# Patient Record
Sex: MALE | Race: WHITE | NOT HISPANIC OR LATINO | Employment: FULL TIME | URBAN - METROPOLITAN AREA
[De-identification: names, ages, dates, MRNs, and addresses within clinical notes are randomized per-mention and may not be internally consistent; named-entity substitution may affect disease eponyms.]

---

## 2017-12-22 ENCOUNTER — MYC REFILL (OUTPATIENT)
Dept: FAMILY MEDICINE | Facility: CLINIC | Age: 47
End: 2017-12-22

## 2017-12-22 DIAGNOSIS — N52.9 ERECTILE DYSFUNCTION, UNSPECIFIED ERECTILE DYSFUNCTION TYPE: ICD-10-CM

## 2017-12-22 RX ORDER — SILDENAFIL CITRATE 20 MG/1
20-60 TABLET ORAL DAILY PRN
Qty: 30 TABLET | Refills: 0 | Status: SHIPPED | OUTPATIENT
Start: 2017-12-22 | End: 2018-01-05

## 2017-12-22 NOTE — TELEPHONE ENCOUNTER
Message from Carbon Blackhart:  Original authorizing provider: Pro Velez MD    Kalyan Hubbard would like a refill of the following medications:  sildenafil (REVATIO/VIAGRA) 20 MG tablet [Pro Velez MD]    Preferred pharmacy: [facility], [facility] [phone]    Comment:

## 2017-12-22 NOTE — TELEPHONE ENCOUNTER
Medication is being filled for 1 time refill only due to:  Patient needs to be seen because has been a year.  does have appt..  Marcie Harkins RN

## 2018-01-05 ENCOUNTER — OFFICE VISIT (OUTPATIENT)
Dept: FAMILY MEDICINE | Facility: CLINIC | Age: 48
End: 2018-01-05
Payer: COMMERCIAL

## 2018-01-05 VITALS
RESPIRATION RATE: 14 BRPM | TEMPERATURE: 98 F | OXYGEN SATURATION: 100 % | HEIGHT: 71 IN | BODY MASS INDEX: 24.92 KG/M2 | HEART RATE: 63 BPM | WEIGHT: 178 LBS | SYSTOLIC BLOOD PRESSURE: 116 MMHG | DIASTOLIC BLOOD PRESSURE: 79 MMHG

## 2018-01-05 DIAGNOSIS — Z00.00 ROUTINE GENERAL MEDICAL EXAMINATION AT A HEALTH CARE FACILITY: Primary | ICD-10-CM

## 2018-01-05 DIAGNOSIS — N52.9 ERECTILE DYSFUNCTION, UNSPECIFIED ERECTILE DYSFUNCTION TYPE: ICD-10-CM

## 2018-01-05 DIAGNOSIS — F32.5 MAJOR DEPRESSION IN COMPLETE REMISSION (H): ICD-10-CM

## 2018-01-05 DIAGNOSIS — M76.62 ACHILLES TENDONITIS, BILATERAL: ICD-10-CM

## 2018-01-05 DIAGNOSIS — Z23 NEED FOR PROPHYLACTIC VACCINATION AND INOCULATION AGAINST INFLUENZA: ICD-10-CM

## 2018-01-05 DIAGNOSIS — M76.61 ACHILLES TENDONITIS, BILATERAL: ICD-10-CM

## 2018-01-05 DIAGNOSIS — B35.1 ONYCHOMYCOSIS: ICD-10-CM

## 2018-01-05 DIAGNOSIS — M25.511 RIGHT SHOULDER PAIN, UNSPECIFIED CHRONICITY: ICD-10-CM

## 2018-01-05 DIAGNOSIS — B00.9 HERPES SIMPLEX VIRUS INFECTION: ICD-10-CM

## 2018-01-05 LAB
ALBUMIN SERPL-MCNC: 4.3 G/DL (ref 3.4–5)
ALP SERPL-CCNC: 65 U/L (ref 40–150)
ALT SERPL W P-5'-P-CCNC: 25 U/L (ref 0–70)
ANION GAP SERPL CALCULATED.3IONS-SCNC: 3 MMOL/L (ref 3–14)
AST SERPL W P-5'-P-CCNC: 15 U/L (ref 0–45)
BILIRUB SERPL-MCNC: 0.6 MG/DL (ref 0.2–1.3)
BUN SERPL-MCNC: 18 MG/DL (ref 7–30)
CALCIUM SERPL-MCNC: 9.5 MG/DL (ref 8.5–10.1)
CHLORIDE SERPL-SCNC: 104 MMOL/L (ref 94–109)
CHOLEST SERPL-MCNC: 222 MG/DL
CO2 SERPL-SCNC: 34 MMOL/L (ref 20–32)
CREAT SERPL-MCNC: 0.9 MG/DL (ref 0.66–1.25)
GFR SERPL CREATININE-BSD FRML MDRD: >90 ML/MIN/1.7M2
GLUCOSE SERPL-MCNC: 54 MG/DL (ref 70–99)
HDLC SERPL-MCNC: 47 MG/DL
LDLC SERPL CALC-MCNC: 140 MG/DL
NONHDLC SERPL-MCNC: 175 MG/DL
POTASSIUM SERPL-SCNC: 5.5 MMOL/L (ref 3.4–5.3)
PROT SERPL-MCNC: 7.4 G/DL (ref 6.8–8.8)
SODIUM SERPL-SCNC: 141 MMOL/L (ref 133–144)
TRIGL SERPL-MCNC: 174 MG/DL

## 2018-01-05 PROCEDURE — 90472 IMMUNIZATION ADMIN EACH ADD: CPT | Performed by: FAMILY MEDICINE

## 2018-01-05 PROCEDURE — 99214 OFFICE O/P EST MOD 30 MIN: CPT | Mod: 25 | Performed by: FAMILY MEDICINE

## 2018-01-05 PROCEDURE — 80053 COMPREHEN METABOLIC PANEL: CPT | Performed by: FAMILY MEDICINE

## 2018-01-05 PROCEDURE — 90471 IMMUNIZATION ADMIN: CPT | Performed by: FAMILY MEDICINE

## 2018-01-05 PROCEDURE — 99396 PREV VISIT EST AGE 40-64: CPT | Mod: 25 | Performed by: FAMILY MEDICINE

## 2018-01-05 PROCEDURE — 80061 LIPID PANEL: CPT | Performed by: FAMILY MEDICINE

## 2018-01-05 PROCEDURE — 36415 COLL VENOUS BLD VENIPUNCTURE: CPT | Performed by: FAMILY MEDICINE

## 2018-01-05 PROCEDURE — 90746 HEPB VACCINE 3 DOSE ADULT IM: CPT | Performed by: FAMILY MEDICINE

## 2018-01-05 PROCEDURE — 90686 IIV4 VACC NO PRSV 0.5 ML IM: CPT | Performed by: FAMILY MEDICINE

## 2018-01-05 RX ORDER — SILDENAFIL CITRATE 20 MG/1
20-60 TABLET ORAL DAILY PRN
Qty: 30 TABLET | Refills: 11 | Status: SHIPPED | OUTPATIENT
Start: 2018-01-05 | End: 2019-11-26

## 2018-01-05 RX ORDER — TERBINAFINE HYDROCHLORIDE 250 MG/1
250 TABLET ORAL DAILY
Qty: 90 TABLET | Refills: 0 | Status: SHIPPED | OUTPATIENT
Start: 2018-01-05 | End: 2019-11-22

## 2018-01-05 RX ORDER — VALACYCLOVIR HYDROCHLORIDE 500 MG/1
500 TABLET, FILM COATED ORAL DAILY
Qty: 90 TABLET | Refills: 3 | Status: SHIPPED | OUTPATIENT
Start: 2018-01-05 | End: 2021-11-24

## 2018-01-05 NOTE — PROGRESS NOTES
SUBJECTIVE:   CC: Kalyan Hubbard is an 47 year old male who presents for preventative health visit.     Healthy Habits:    Do you get at least three servings of calcium containing foods daily (dairy, green leafy vegetables, etc.)? Yes-almost everyday    Amount of exercise or daily activities, outside of work: 5-7 day(s) per week    Problems taking medications regularly No    Medication side effects: No    Have you had an eye exam in the past two years? yes    Do you see a dentist twice per year? yes    Do you have sleep apnea, excessive snoring or daytime drowsiness?no     Other Concern:    sports injury-shoulder pain when swimming. Not noted much at other times. Usually with over the head activities. No weakness   achilles pain bilateral. Worse when runs especially longer distances. stiffens when sitting.  Refills for ed meds and depression med.s both of these are working well.   oncyhomycosis-interested in meds. Worsening slowly.    Today's PHQ-2 Score:   PHQ-2 ( 1999 Pfizer) 1/5/2018 10/25/2016   Q1: Little interest or pleasure in doing things 0 0   Q2: Feeling down, depressed or hopeless 0 0   PHQ-2 Score 0 0   Q1: Little interest or pleasure in doing things - -   Q2: Feeling down, depressed or hopeless - -   PHQ-2 Score - -         Abuse: Current or Past(Physical, Sexual or Emotional)- No  Do you feel safe in your environment - Yes  Social History   Substance Use Topics     Smoking status: Never Smoker     Smokeless tobacco: Never Used     Alcohol use No      Comment: very rare      If you drink alcohol do you typically have >3 drinks per day or >7 drinks per week? No                      Last PSA: No results found for: PSA    Reviewed orders with patient. Reviewed health maintenance and updated orders accordingly - Yes  Labs reviewed in EPIC    Reviewed and updated as needed this visit by clinical staff  Tobacco  Meds  Med Hx  Surg Hx  Fam Hx  Soc Hx        Reviewed and updated as needed this visit by  "Provider  Meds            ROS:  C: NEGATIVE for fever, chills, change in weight  I: NEGATIVE for worrisome rashes, moles or lesions  E: NEGATIVE for vision changes or irritation  ENT: NEGATIVE for ear, mouth and throat problems  R: NEGATIVE for significant cough or SOB  CV: NEGATIVE for chest pain, palpitations or peripheral edema  GI: NEGATIVE for nausea, abdominal pain, heartburn, or change in bowel habits   male: negative for dysuria, hematuria, decreased urinary stream, erectile dysfunction, urethral discharge  MUSCULOSKELETAL: see above.   N: NEGATIVE for weakness, dizziness or paresthesias  P: NEGATIVE for changes in mood or affect    OBJECTIVE:   /79 (BP Location: Right arm, Patient Position: Sitting, Cuff Size: Adult Regular)  Pulse 63  Temp 98  F (36.7  C) (Oral)  Resp 14  Ht 5' 11\" (1.803 m)  Wt 178 lb (80.7 kg)  SpO2 100%  BMI 24.83 kg/m2  EXAM:  GENERAL: healthy, alert and no distress  EYES: Eyes grossly normal to inspection, PERRL and conjunctivae and sclerae normal  HENT: ear canals and TM's normal, nose and mouth without ulcers or lesions  NECK: no adenopathy, no asymmetry, masses, or scars and thyroid normal to palpation  RESP: lungs clear to auscultation - no rales, rhonchi or wheezes  CV: regular rate and rhythm, normal S1 S2, no S3 or S4, no murmur, click or rub, no peripheral edema and peripheral pulses strong  ABDOMEN: soft, nontender, no hepatosplenomegaly, no masses and bowel sounds normal  MS: Shoulder exam: normal exam, no swelling, tenderness, instability; ligaments intact, FROM shoulder joint. except painful arc and positive impingement signs  Foot/ankle exam: soft tissue tenderness at the mid achilles. No ankle abnormalities.   SKIN: no suspicious lesions or rashes and onychomycosis  NEURO: Normal strength and tone, mentation intact and speech normal  PSYCH: mentation appears normal, affect normal/bright    ASSESSMENT/PLAN:       ICD-10-CM    1. Routine general medical " "examination at a health care facility Z00.00 Lipid panel reflex to direct LDL Fasting     Comprehensive metabolic panel     HEPATITIS B VACCINE,ADULT,IM     CANCELED: HEPATITIS B VACCINE, ADULT, IM   2. Need for prophylactic vaccination and inoculation against influenza Z23 HC FLU VAC PRESRV FREE QUAD SPLIT VIR 3+YRS IM  [59001]          ADMIN VACCINE, FIRST [88293]     CANCELED: HEPATITIS B VACCINE, ADULT, IM   3. Erectile dysfunction, unspecified erectile dysfunction type N52.9 sildenafil (REVATIO) 20 MG tablet   4. Herpes simplex virus infection B00.9 valACYclovir (VALTREX) 500 MG tablet     Comprehensive metabolic panel   5. Major depression in complete remission (H) F32.5    6. Achilles tendonitis, bilateral M76.61 [facility] PT, HAND, AND CHIROPRACTIC REFERRAL    M76.62    7. Right shoulder pain, unspecified chronicity M25.511 [facility], HAND, AND CHIR[facility]RACTIC REFERRAL   8. Onychomycosis B35.1 terbinafine (LAMISIL) 250 MG tablet     **ALT FUTURE anytime   discussed optoins for onychomycosis. Lamisil. recheck alt in 4 weeks. refilled depression meds and ed meds. Physical therapy for msk complaints. If not getting better then to sports med.     COUNSELING:  Reviewed preventive health counseling, as reflected in patient instructions       Regular exercise       Healthy diet/nutrition       Immunizations    Vaccinated for: Hepatitis B and Influenza           Colon cancer screening         reports that he has never smoked. He has never used smokeless tobacco.      Estimated body mass index is 24.83 kg/(m^2) as calculated from the following:    Height as of this encounter: 5' 11\" (1.803 m).    Weight as of this encounter: 178 lb (80.7 kg).         Counseling Resources:  ATP IV Guidelines  Pooled Cohorts Equation Calculator  FRAX Risk Assessment  ICSI Preventive Guidelines  Dietary Guidelines for Americans, 2010  USDA's MyPlate  ASA Prophylaxis  Lung CA Screening    Pro Velez MD  [facility] " Influenza Immunization Documentation    1.  Is the person to be vaccinated sick today?   No    2. Does the person to be vaccinated have an allergy to a component   of the vaccine?   No  Egg Allergy Algorithm Link    3. Has the person to be vaccinated ever had a serious reaction   to influenza vaccine in the past?   No    4. Has the person to be vaccinated ever had Guillain-Barré syndrome?   No    Form completed by Donald Shaw MA

## 2018-01-05 NOTE — NURSING NOTE
"Chief Complaint   Patient presents with     Physical       Initial /79 (BP Location: Right arm, Patient Position: Sitting, Cuff Size: Adult Regular)  Pulse 63  Temp 98  F (36.7  C) (Oral)  Resp 14  Ht 5' 11\" (1.803 m)  Wt 178 lb (80.7 kg)  SpO2 100%  BMI 24.83 kg/m2 Estimated body mass index is 24.83 kg/(m^2) as calculated from the following:    Height as of this encounter: 5' 11\" (1.803 m).    Weight as of this encounter: 178 lb (80.7 kg).  Medication Reconciliation: complete       Donald Shaw MA      "

## 2018-01-05 NOTE — NURSING NOTE
Screening Questionnaire for Adult Immunization    Are you sick today?   No   Do you have allergies to medications, food, a vaccine component or latex?   No   Have you ever had a serious reaction after receiving a vaccination?   No   Do you have a long-term health problem with heart disease, lung disease, asthma, kidney disease, metabolic disease (e.g. diabetes), anemia, or other blood disorder?   No   Do you have cancer, leukemia, HIV/AIDS, or any other immune system problem?   No   In the past 3 months, have you taken medications that affect  your immune system, such as prednisone, other steroids, or anticancer drugs; drugs for the treatment of rheumatoid arthritis, Crohn s disease, or psoriasis; or have you had radiation treatments?   No   Have you had a seizure, or a brain or other nervous system problem?   No   During the past year, have you received a transfusion of blood or blood     products, or been given immune (gamma) globulin or antiviral drug?   No   For women: Are you pregnant or is there a chance you could become        pregnant during the next month?   No   Have you received any vaccinations in the past 4 weeks?   No     Immunization questionnaire answers were all negative.        Per orders of Dr. Velez, injection of HEP B and Flu given by Donald Shaw. Patient instructed to remain in clinic for 15 minutes afterwards, and to report any adverse reaction to me immediately.       Screening performed by Donald Shaw on 1/5/2018 at 9:01 AM.

## 2018-01-05 NOTE — MR AVS SNAPSHOT
After Visit Summary   1/5/2018    Kalyan Hubbard    MRN: [id number]           Patient Information     Date Of Birth          1970        Visit Information        Provider Department      1/5/2018 8:00 AM Pro Velez MD [facility]        Today's Diagnoses     Routine general medical examination at a health care facility    -  1    Need for prophylactic vaccination and inoculation against influenza        Erectile dysfunction, unspecified erectile dysfunction type        Herpes simplex virus infection        Major depression in complete remission (H)        Achilles tendonitis, bilateral        Right shoulder pain, unspecified chronicity        Onychomycosis          Care Instructions      Preventive Health Recommendations  Male Ages 40 to 49    Yearly exam:             See your health care provider every year in order to  o   Review health changes.   o   Discuss preventive care.    o   Review your medicines if your doctor has prescribed any.    You should be tested each year for STDs (sexually transmitted diseases) if you re at risk.     Have a cholesterol test every 5 years.     Have a colonoscopy (test for colon cancer) if someone in your family has had colon cancer or polyps before age 50.     After age 45, have a diabetes test (fasting glucose). If you are at risk for diabetes, you should have this test every 3 years.      Talk with your health care provider about whether or not a prostate cancer screening test (PSA) is right for you.    Shots: Get a flu shot each year. Get a tetanus shot every 10 years.     Nutrition:    Eat at least 5 servings of fruits and vegetables daily.     Eat whole-grain bread, whole-wheat pasta and brown rice instead of white grains and rice.     Talk to your provider about Calcium and Vitamin D.     Lifestyle    Exercise for at least 150 minutes a week (30 minutes a day, 5 days a week). This will help you control your weight and prevent  disease.     Limit alcohol to one drink per day.     No smoking.     Wear sunscreen to prevent skin cancer.     See your dentist every six months for an exam and cleaning.              Follow-ups after your visit        Additional Services     [facility] PT, HAND, AND CHIROPRACTIC REFERRAL       **This order will print in the [facility] Scheduling Office**    Physical Therapy, Hand Therapy and Chiropractic Care are available through:    *[facility]  *[facility]  *[facility]    Call one number to schedule at any of the above locations: [phone].    Your provider has referred you to: Physical Therapy at [facility] or [facility]    Indication/Reason for Referral: Shoulder Pain and achilles pain  Onset of Illness: weeks to months  Therapy Orders: Evaluate and Treat  Special Programs: None  Special Request: None    Ramsey Mustafa      Additional Comments for the Therapist or Chiropractor:      Please be aware that coverage of these services is subject to the terms and limitations of your health insurance plan.  Call member services at your health plan with any benefit or coverage questions.      Please bring the following to your appointment:    *Your personal calendar for scheduling future appointments  *Comfortable clothing                  Follow-up notes from your care team     Return in about 1 year (around 1/5/2019) for Physical Exam.      Future tests that were ordered for you today     Open Standing Orders        Priority Remaining Interval Expires Ordered    HEPATITIS B VACCINE, ADULT, IM Routine 2/2  1/5/2019 1/5/2018          Open Future Orders        Priority Expected Expires Ordered    **ALT FUTURE anytime Routine 1/5/2018 1/5/2019 1/5/2018            Who to contact     If you have questions or need follow up information about today's clinic visit or your schedule please contact [facility] directly at [phone].  Normal or non-critical lab and imaging  "results will be communicated to you by MyChart, letter or phone within 4 business days after the clinic has received the results. If you do not hear from us within 7 days, please contact the clinic through [employer] or phone. If you have a critical or abnormal lab result, we will notify you by phone as soon as possible.  Submit refill requests through [employer] or call your pharmacy and they will forward the refill request to us. Please allow 3 business days for your refill to be completed.          Additional Information About Your Visit        [employer] Information     [employer] gives you secure access to your electronic health record. If you see a primary care provider, you can also send messages to your care team and make appointments. If you have questions, please call your primary care clinic.  If you do not have a primary care provider, please call [phone] and they will assist you.        Care EveryWhere ID     This is your Care EveryWhere ID. This could be used by other organizations to access your [facility] medical records  [id number]        Your Vitals Were     Pulse Temperature Respirations Height Pulse Oximetry BMI (Body Mass Index)    63 98  F (36.7  C) (Oral) 14 5' 11\" (1.803 m) 100% 24.83 kg/m2       Blood Pressure from Last 3 Encounters:   01/05/18 116/79   10/25/16 100/64   09/30/16 110/80    Weight from Last 3 Encounters:   01/05/18 178 lb (80.7 kg)   10/25/16 190 lb (86.2 kg)   09/30/16 196 lb (88.9 kg)              We Performed the Following          ADMIN VACCINE, FIRST [19624]     Comprehensive metabolic panel     DEPRESSION ACTION PLAN (DAP)     HC FLU VAC PRESRV FREE QUAD SPLIT VIR 3+YRS IM  [[id number]]     [facility] PT, HAND, AND CHIROPRACTIC REFERRAL     Lipid panel reflex to direct LDL Fasting          Today's Medication Changes          These changes are accurate as of: 1/5/18  8:35 AM.  If you have any questions, ask your nurse or doctor.               Start taking these medicines.        " Dose/Directions    terbinafine 250 MG tablet   Commonly known as:  lamISIL   Used for:  Onychomycosis   Started by:  Pro Velez MD        Dose:  250 mg   Take 1 tablet (250 mg) by mouth daily   Quantity:  90 tablet   Refills:  0            Where to get your medicines      These medications were sent to [facility] [address] [place] [address] [address] [facility]  [address] [facility] [address]     Phone:  [phone]     sildenafil 20 MG tablet    terbinafine 250 MG tablet    valACYclovir 500 MG tablet                Primary Care Provider Office Phone # Fax #    Pro Velez [phone]       [address]  [facility] [address]        Equal Access to Services     MATA ARRIETA : Hadii chen cody hadasho Sociro, waaxda luqadaha, qaybta kaalmada adeegyada, pari ham . So [facility] [phone].    ATENCIÓN: Si habla español, tiene a toussaint disposición servicios gratuitos de asistencia lingüística. [facility] [phone].    We comply with applicable federal civil rights laws and [place] laws. We do not discriminate on the basis of race, color, national origin, age, disability, sex, sexual orientation, or gender identity.            Thank you!     Thank you for choosing [facility]  for your care. Our goal is always to provide you with excellent care. Hearing back from our patients is one way we can continue to improve our services. Please take a few minutes to complete the written survey that you may receive in the mail after your visit with us. Thank you!             Your Updated Medication List - Protect others around you: Learn how to safely use, store and throw away your medicines at www.disposemymeds.org.          This list is accurate as of: 1/5/18  8:35 AM.  Always use your most recent med list.                   Brand Name Dispense Instructions for use Diagnosis    sildenafil 20 MG tablet    REVATIO    30 tablet    Take 1-3  tablets (20-60 mg) by mouth daily as needed For pulmonary hypertension.  Never use with nitroglycerin, terazosin or doxazosin.    Erectile dysfunction, unspecified erectile dysfunction type       terbinafine 250 MG tablet    lamISIL    90 tablet    Take 1 tablet (250 mg) by mouth daily    Onychomycosis       valACYclovir 500 MG tablet    VALTREX    90 tablet    Take 1 tablet (500 mg) by mouth daily    Herpes simplex virus infection

## 2018-01-05 NOTE — LETTER
My Depression Action Plan  Name: Kalyan Hubbard   Date of Birth 1970  Date: 1/5/2018    My doctor: Pro Velez   My clinic: [address] [id number] [phone]          GREEN    ZONE   Good Control    What it looks like:     Things are going generally well. You have normal up s and down s. You may even feel depressed from time to time, but bad moods usually last less than a day.   What you need to do:  1. Continue to care for yourself (see self care plan)  2. Check your depression survival kit and update it as needed  3. Follow your physician s recommendations including any medication.  4. Do not stop taking medication unless you consult with your physician first.           YELLOW         ZONE Getting Worse    What it looks like:     Depression is starting to interfere with your life.     It may be hard to get out of bed; you may be starting to isolate yourself from others.    Symptoms of depression are starting to last most all day and this has happened for several days.     You may have suicidal thoughts but they are not constant.   What you need to do:     1. Call your care team, your response to treatment will improve if you keep your care team informed of your progress. Yellow periods are signs an adjustment may need to be made.     2. Continue your self-care, even if you have to fake it!    3. Talk to someone in your support network    4. Open up your depression survival kit           RED    ZONE Medical Alert - Get Help    What it looks like:     Depression is seriously interfering with your life.     You may experience these or other symptoms: You can t get out of bed most days, can t work or engage in other necessary activities, you have trouble taking care of basic hygiene, or basic responsibilities, thoughts of suicide or death that will not go away, self-injurious behavior.     What you need to do:  1. Call your care team and  request a same-day appointment. If they are not available (weekends or after hours) call your local crisis line, emergency room or 911.      Electronically signed by: Donald Shaw, January 5, 2018    Depression Self Care Plan / Survival Kit    Self-Care for Depression  Here s the deal. Your body and mind are really not as separate as most people think.  What you do and think affects how you feel and how you feel influences what you do and think. This means if you do things that people who feel good do, it will help you feel better.  Sometimes this is all it takes.  There is also a place for medication and therapy depending on how severe your depression is, so be sure to consult with your medical provider and/ or Behavioral Health Consultant if your symptoms are worsening or not improving.     In order to better manage my stress, I will:    Exercise  Get some form of exercise, every day. This will help reduce pain and release endorphins, the  feel good  chemicals in your brain. This is almost as good as taking antidepressants!  This is not the same as joining a gym and then never going! (they count on that by the way ) It can be as simple as just going for a walk or doing some gardening, anything that will get you moving.      Hygiene   Maintain good hygiene (Get out of bed in the morning, Make your bed, Brush your teeth, Take a shower, and Get dressed like you were going to work, even if you are unemployed).  If your clothes don't fit try to get ones that do.    Diet  I will strive to eat foods that are good for me, drink plenty of water, and avoid excessive sugar, caffeine, alcohol, and other mood-altering substances.  Some foods that are helpful in depression are: complex carbohydrates, B vitamins, flaxseed, fish or fish oil, fresh fruits and vegetables.    Psychotherapy  I agree to participate in Individual Therapy (if recommended).    Medication  If prescribed medications, I agree to take them.  Missing doses can  result in serious side effects.  I understand that drinking alcohol, or other illicit drug use, may cause potential side effects.  I will not stop my medication abruptly without first discussing it with my provider.    Staying Connected With Others  I will stay in touch with my friends, family members, and my primary care provider/team.    Use your imagination  Be creative.  We all have a creative side; it doesn t matter if it s oil painting, sand castles, or mud pies! This will also kick up the endorphins.    Witness Beauty  (AKA stop and smell the roses) Take a look outside, even in mid-winter. Notice colors, textures. Watch the squirrels and birds.     Service to others  Be of service to others.  There is always someone else in need.  By helping others we can  get out of ourselves  and remember the really important things.  This also provides opportunities for practicing all the other parts of the program.    Humor  Laugh and be silly!  Adjust your TV habits for less news and crime-drama and more comedy.    Control your stress  Try breathing deep, massage therapy, biofeedback, and meditation. Find time to relax each day.     My support system    Clinic Contact:  Phone number:    Contact 1:  Phone number:    Contact 2:  Phone number:    Orthodoxy/:  Phone number:    Therapist:  Phone number:    Local crisis center:    Phone number:    Other community support:  Phone number:

## 2018-01-08 ENCOUNTER — TELEPHONE (OUTPATIENT)
Dept: FAMILY MEDICINE | Facility: CLINIC | Age: 48
End: 2018-01-08

## 2018-01-08 DIAGNOSIS — E87.5 SERUM POTASSIUM ELEVATED: Primary | ICD-10-CM

## 2018-01-08 NOTE — TELEPHONE ENCOUNTER
Your liver tests, kidney tests, and electrolytes are normal except the potassium was high. Let's just recheck this within the next month.     Your Total and LDL (bad) cholesterol levels are mildly elevated.   Diet and exercise changes are recommended to help lower these. I would not use medication at this level. Dietary changes to avoid saturated fats, and include more unsaturated fats (such as olive oil), soy protein, omega three fatty acids (as found in supplements or fish), and more nuts as protein may help bring down cholesterol. If you would like to meet with a dietician, let us know.     I will have my nurse try to call you with the results to make sure you get the message.    Pro Velez

## 2018-01-08 NOTE — LETTER
"January 9, 2018      Kalyan Hubbard  [address] [facility] [address]        Dear Kalyan,       I was unable to reach you by phone but here are the comments from Dr. Velez regarding your test results.  There are lab orders in your chart so you can make a \"LAB ONLY\" visit in about a month to recheck your potassium level.  Marcie BRAN RN    Notes from Dr. Velez:    Your liver tests, kidney tests, and electrolytes are normal except the potassium was high. Let's just recheck this within the next month.      Your Total and LDL (bad) cholesterol levels are mildly elevated.   Diet and exercise changes are recommended to help lower these. I would not use medication at this level. Dietary changes to avoid saturated fats, and include more unsaturated fats (such as olive oil), soy protein, omega three fatty acids (as found in supplements or fish), and more nuts as protein may help bring down cholesterol. If you would like to meet with a dietician, let us know.      I will have my nurse try to call you with the results to make sure you get the message.            Sincerely,        Pro Velez MD/TE          "

## 2018-01-08 NOTE — TELEPHONE ENCOUNTER
KATHERINE and asked him to call back to discuss lab results and recommendations.  Marcie Harkins RN

## 2018-01-09 NOTE — TELEPHONE ENCOUNTER
[facility]  Now mailing letter and sending low cholesterol diet info.  Marcie Harkins RN

## 2018-01-16 ENCOUNTER — THERAPY VISIT (OUTPATIENT)
Dept: PHYSICAL THERAPY | Facility: CLINIC | Age: 48
End: 2018-01-16
Payer: COMMERCIAL

## 2018-01-16 DIAGNOSIS — E87.5 SERUM POTASSIUM ELEVATED: ICD-10-CM

## 2018-01-16 DIAGNOSIS — M76.62 ACHILLES TENDONITIS, BILATERAL: Primary | ICD-10-CM

## 2018-01-16 DIAGNOSIS — B35.1 ONYCHOMYCOSIS: ICD-10-CM

## 2018-01-16 DIAGNOSIS — M25.511 CHRONIC RIGHT SHOULDER PAIN: ICD-10-CM

## 2018-01-16 DIAGNOSIS — G89.29 CHRONIC RIGHT SHOULDER PAIN: ICD-10-CM

## 2018-01-16 DIAGNOSIS — M76.61 ACHILLES TENDONITIS, BILATERAL: Primary | ICD-10-CM

## 2018-01-16 PROCEDURE — 84460 ALANINE AMINO (ALT) (SGPT): CPT | Performed by: FAMILY MEDICINE

## 2018-01-16 PROCEDURE — 97161 PT EVAL LOW COMPLEX 20 MIN: CPT | Mod: GP | Performed by: PHYSICAL THERAPIST

## 2018-01-16 PROCEDURE — 84132 ASSAY OF SERUM POTASSIUM: CPT | Performed by: FAMILY MEDICINE

## 2018-01-16 PROCEDURE — 97110 THERAPEUTIC EXERCISES: CPT | Mod: GP | Performed by: PHYSICAL THERAPIST

## 2018-01-16 PROCEDURE — 36415 COLL VENOUS BLD VENIPUNCTURE: CPT | Performed by: FAMILY MEDICINE

## 2018-01-16 NOTE — PROGRESS NOTES
[facility] Initial Evaluation  Subjective:  Patient is a 47 year old male presenting with rehab left ankle/foot hpi and rehab right shoulder hpi.   Kalyan Hubbard is a 47 year old male with a bilateral ankles condition.  Condition occurred with:  Running.  Condition occurred: during recreation/sport.  This is a chronic condition  1/5/18. Patient started having B posterior ankle pain about 18 years ago with running. He is currently running 2-3x/week for 3-4 miles. He normally performs longer runs during nicer weather seasons..    Patient reports pain:  Posterior.    Pain is described as aching and sharp and is intermittent and reported as 4/10 and 7/10.  Associated symptoms:  Loss of motion/stiffness. Pain is worse in the A.M..  Symptoms are exacerbated by running and other (initiating standing and walking initially in morning) and relieved by rest.  Since onset symptoms are unchanged.        General health as reported by patient is good.  Pertinent medical history includes:  None.  Medical allergies: no.  Other surgeries include:  None reported.  Current medications:  None as reported by patient.  Current occupation is .  Patient is working in normal job without restrictions.  Primary job tasks include:  Prolonged sitting.    Barriers include:  None as reported by patient.    Red flags:  None as reported by patient.      Kalyan Hubbard is a 47 year old male with a right shoulder condition.  Condition occurred with:  Repetition/overuse.  Condition occurred: during recreation/sport.  This is a chronic condition  Feb. 2017. Patient started having R shoulder pain wit swimming in about Feb. 2017..    Patient reports pain:  Lateral.  Radiates to:  Upper arm.  Pain is described as sharp and is intermittent and reported as 5/10.   Pain is worse during the day.  Symptoms are exacerbated by lifting and other (swimming) and relieved by rest.  Since onset symptoms are gradually  worsening.                                                      Objective:  Standing Alignment:    Cervical/Thoracic:  Forward head  Shoulder/UE:  Rounded shoulders        Knee:  Genu recurvatum R      Gait:    Gait Type:  Normal   Assistive Devices:  None  Deviations:  Ankle:  Pronation incr R          Ankle/Foot Evaluation  ROM:        Strength:                        Gastroc/Soleus:Left: 5/5    Pain:-  Right: 5/5   Pain:-    SPECIAL TESTS:     Left ankle negative for the following special tests:  Christy sign    Right ankle negative for the following special tests:  Christy sign  PALPATION:   Left ankle tenderness present at:  gastroc/soleus; achilles tendon and medial calcaneal  Right ankle tenderness present at:   gastroc/soleus; achilles tendon and medial calcaneal  EDEMA: normal            FUNCTIONAL TESTS:         Quad:  Single Leg Squat Left: no pain  Control is mild loss of control, excessive anterior knee excursion and femoral IR.  Single Leg Squat Right: no pain Control is moderate loss of control, femoral IR and exessive anterior knee excursion  Bilateral Leg Squat: B toe out and excessive pronation, no pain   Control is normal control  Retro Step Up: Left: mild loss of control, no pain.   Right: moderate loss of control, no pain.                                                         General     ROS    Assessment/Plan:    Patient is a 47 year old male with right side shoulder and both sides ankle complaints.    Patient has the following significant findings with corresponding treatment plan.                Diagnosis 1:  Achilles tendonitis, bilateral  Pain -  hot/cold therapy, manual therapy, splint/taping/bracing/orthotics, self management, education and home program  Decreased ROM/flexibility - manual therapy, therapeutic exercise and home program  Decreased strength - therapeutic exercise, therapeutic activities and home program  Decreased proprioception - neuro re-education, therapeutic  activities and home program  Impaired gait - gait training and home program  Impaired muscle performance - neuro re-education and home program  Decreased function - therapeutic activities and home program  Diagnosis 2:  Right shoulder pain   Pain -  hot/cold therapy, manual therapy, splint/taping/bracing/orthotics, self management, education and home program  Decreased ROM/flexibility - manual therapy, therapeutic exercise and home program  Decreased strength - therapeutic exercise, therapeutic activities and home program  Decreased proprioception - neuro re-education, therapeutic activities and home program  Impaired muscle performance - neuro re-education and home program  Decreased function - therapeutic activities and home program  Impaired posture - neuro re-education and home program    Therapy Evaluation Codes:   1) History comprised of:   Personal factors that impact the plan of care:      Time since onset of symptoms.    Comorbidity factors that impact the plan of care are:      None.     Medications impacting care: None.  2) Examination of Body Systems comprised of:   Body structures and functions that impact the plan of care:      Ankle and Shoulder.   Activity limitations that impact the plan of care are:      Lifting and Running.  3) Clinical presentation characteristics are:   Stable/Uncomplicated.  4) Decision-Making    Low complexity using standardized patient assessment instrument and/or measureable assessment of functional outcome.  Cumulative Therapy Evaluation is: Low complexity.    Previous and current functional limitations:  (See Goal Flow Sheet for this information)    Short term and Long term goals: (See Goal Flow Sheet for this information)     Communication ability:  Patient appears to be able to clearly communicate and understand verbal and written communication and follow directions correctly.  Treatment Explanation - The following has been discussed with the patient:   RX ordered/plan  of care  Anticipated outcomes  Possible risks and side effects  This patient would benefit from PT intervention to resume normal activities.   Rehab potential is good.    Frequency:  1 X week, once daily  Duration:  for 6 weeks tapering to 1 X every other week over 4 weeks  Discharge Plan:  Achieve all LTG.  Independent in home treatment program.  Reach maximal therapeutic benefit.    Please refer to the daily flowsheet for treatment today, total treatment time and time spent performing 1:1 timed codes.

## 2018-01-16 NOTE — MR AVS SNAPSHOT
After Visit Summary   1/16/2018    Kalyan Hubbard    MRN: [id number]           Patient Information     Date Of Birth          1970        Visit Information        Provider Department      1/16/2018 1:20 PM Nicola Skelton, PT [facility]        Today's Diagnoses     Achilles tendonitis, bilateral    -  1    Chronic right shoulder pain           Follow-ups after your visit        Your next 10 appointments already scheduled     Jan 23, 2018  5:20 PM CST   VERNA Running with Nicola Skelton PT   [facility] ([facility]  )    [address] [place] [id number]   [phone]            Jan 30, 2018  1:20 PM CST   VERNA Running with Nicola Skelton PT   Critical access hospital ([facility]  )    [address] [place] [id number]   [phone]            Feb 02, 2018  7:45 AM CST   LAB with [facility]   [facility] ([facility])    [address] [place] [id number] [phone]           Please do not eat 10-12 hours before your appointment if you are coming in fasting for labs on lipids, cholesterol, or glucose (sugar). This does not apply to pregnant women. Water, hot tea and black coffee (with nothing added) are okay. Do not drink other fluids, diet soda or chew gum.            Mar 09, 2018  8:00 AM CST   Nurse Only with [facility]   [facility] ([facility])    [address] [place] [id number] [phone]              Who to contact     If you have questions or need follow up information about today's clinic visit or your schedule please contact [facility] directly at [phone].  Normal or non-critical lab and imaging results will be communicated to you by Scarlet  letter or phone within 4 business days after the clinic has received the results. If you do not hear from us within 7 days, please contact the clinic through [employer] or phone. If you have a critical or abnormal lab result, we will notify you by phone as soon as possible.  Submit refill requests through [employer] or call your pharmacy and they will forward the refill request to us. Please allow 3 business days for your refill to be completed.          Additional Information About Your Visit        Daily Deals for [employer] Information     [employer] gives you secure access to your electronic health record. If you see a primary care provider, you can also send messages to your care team and make appointments. If you have questions, please call your primary care clinic.  If you do not have a primary care provider, please call [phone] and they will assist you.        Care EveryWhere ID     This is your Care EveryWhere ID. This could be used by other organizations to access your [facility] medical records  [id number]         Blood Pressure from Last 3 Encounters:   01/05/18 116/79   10/25/16 100/64   09/30/16 110/80    Weight from Last 3 Encounters:   01/05/18 80.7 kg (178 lb)   10/25/16 86.2 kg (190 lb)   09/30/16 88.9 kg (196 lb)              We Performed the Following     VERNA Inital Eval Report     PT Eval, Low Complexity (84153)     Therapeutic Exercises        Primary Care Provider Office Phone # Fax #    Pro Compa Velez [phone]       [address]  [facility] [address]        Equal Access to Services     GIOVANNI ARRIETA : Hadii aad ku hadasho Soomaali, waaxda luqadaha, qaybta kaalmada adeegyada, waxay sophiain hayrinn kamilla ham . So [facility] [phone].    ATENCIÓN: Si habla español, tiene a toussaint disposición servicios gratuitos de asistencia lingüística. Llame al [phone].    We comply with applicable federal civil rights laws and [place] laws. We do not discriminate on the basis of race, color, national  origin, age, disability, sex, sexual orientation, or gender identity.            Thank you!     Thank you for choosing [facility] PHYS[facility]  for your care. Our goal is always to provide you with excellent care. Hearing back from our patients is one way we can continue to improve our services. Please take a few minutes to complete the written survey that you may receive in the mail after your visit with us. Thank you!             Your Updated Medication List - Protect others around you: Learn how to safely use, store and throw away your medicines at www.disposemymeds.org.          This list is accurate as of: 1/16/18  3:16 PM.  Always use your most recent med list.                   Brand Name Dispense Instructions for use Diagnosis    sildenafil 20 MG tablet    REVATIO    30 tablet    Take 1-3 tablets (20-60 mg) by mouth daily as needed For pulmonary hypertension.  Never use with nitroglycerin, terazosin or doxazosin.    Erectile dysfunction, unspecified erectile dysfunction type       terbinafine 250 MG tablet    lamISIL    90 tablet    Take 1 tablet (250 mg) by mouth daily    Onychomycosis       valACYclovir 500 MG tablet    VALTREX    90 tablet    Take 1 tablet (500 mg) by mouth daily    Herpes simplex virus infection

## 2018-01-16 NOTE — LETTER
[facility] ATHLETIC [facility] PHYSICAL [facility]  [address] [address]  [phone]    2018    Re: Kalyan Hubbard   :   1970  MRN:  [id number]   REFERRING PHYSICIAN:   Pro Velez    [facility] ATHLETIC [facility] PHYSICAL [facility]    Date of Initial Evaluation:  18  Visits:  Rxs Used: 1  Reason for Referral:     Achilles tendonitis, bilateral  Chronic right shoulder pain    EVALUATION SUMMARY    [facility] Initial Evaluation  Subjective:  Patient is a 47 year old male presenting with rehab left ankle/foot hpi and rehab right shoulder hpi.   Kalyan Hubbard is a 47 year old male with a bilateral ankles condition.  Condition occurred with:  Running.  Condition occurred: during recreation/sport.  This is a chronic condition  18. Patient started having B posterior ankle pain about 18 years ago with running. He is currently running 2-3x/week for 3-4 miles. He normally performs longer runs during nicer weather seasons..    Patient reports pain:  Posterior.    Pain is described as aching and sharp and is intermittent and reported as 4/10 and 7/10.  Associated symptoms:  Loss of motion/stiffness. Pain is worse in the A.M..  Symptoms are exacerbated by running and other (initiating standing and walking initially in morning) and relieved by rest.  Since onset symptoms are unchanged.        General health as reported by patient is good.  Pertinent medical history includes:  None.  Medical allergies: no.  Other surgeries include:  None reported.  Current medications:  None as reported by patient.  Current occupation is .  Patient is working in normal job without restrictions.  Primary job tasks include:  Prolonged sitting.  Barriers include:  None as reported by patient.  Red flags:  None as reported by patient.  Kalyan Hubbard is a 47 year old male with a right shoulder condition.  Condition occurred  with:  Repetition/overuse.  Condition occurred: during recreation/sport.  This is a chronic condition  2017. Patient started having R shoulder pain wit swimming in about 2017..    Patient reports pain:  Lateral.  Radiates to:  Upper arm.  Pain is described as sharp and is intermittent and reported as 5/10.   Pain is worse during the day.  Symptoms are exacerbated by lifting and other (swimming) and relieved by rest.  Since onset symptoms are gradually worsening.                        Objective:  Standing Alignment:    Cervical/Thoracic:  Forward head  Shoulder/UE:  Rounded shoulders  Re: Kalyan Hubbard   :   1970    Knee:  Genu recurvatum R    Gait:    Gait Type:  Normal   Assistive Devices:  None  Deviations:  Ankle:  Pronation incr R    Ankle/Foot Evaluation  ROM:      Strength:    Gastroc/Soleus:Left:     Pain:-  Right:    Pain:-    SPECIAL TESTS:   Left ankle negative for the following special tests:  Christy sign  Right ankle negative for the following special tests:  Christy sign    PALPATION:   Left ankle tenderness present at:  gastroc/soleus; achilles tendon and medial calcaneal  Right ankle tenderness present at:   gastroc/soleus; achilles tendon and medial calcaneal    EDEMA: normal    FUNCTIONAL TESTS:   Quad:  Single Leg Squat Left: no pain  Control is mild loss of control, excessive anterior knee excursion and femoral IR.  Single Leg Squat Right: no pain Control is moderate loss of control, femoral IR and exessive anterior knee excursion  Bilateral Leg Squat: B toe out and excessive pronation, no pain   Control is normal control  Retro Step Up: Left: mild loss of control, no pain.   Right: moderate loss of control, no pain.     Assessment/Plan:    Patient is a 47 year old male with right side shoulder and both sides ankle complaints.    Patient has the following significant findings with corresponding treatment plan.                Diagnosis 1:  Achilles tendonitis, bilateral   Pain -  hot/cold therapy, manual therapy, splint/taping/bracing/orthotics, self management, education and home program  Decreased ROM/flexibility - manual therapy, therapeutic exercise and home program  Decreased strength - therapeutic exercise, therapeutic activities and home program  Decreased proprioception - neuro re-education, therapeutic activities and home program  Impaired gait - gait training and home program  Impaired muscle performance - neuro re-education and home program  Decreased function - therapeutic activities and home program  Diagnosis 2:  Right shoulder pain   Pain -  hot/cold therapy, manual therapy, splint/taping/bracing/orthotics, self management, education and home program  Decreased ROM/flexibility - manual therapy, therapeutic exercise and home program  Decreased strength - therapeutic exercise, therapeutic activities and home program  Decreased proprioception - neuro re-education, therapeutic activities and home program  Impaired muscle performance - neuro re-education and home program  Re: Kalyan Haydee   :   1970    Decreased function - therapeutic activities and home program  Impaired posture - neuro re-education and home program    Therapy Evaluation Codes:   1) History comprised of:   Personal factors that impact the plan of care:      Time since onset of symptoms.    Comorbidity factors that impact the plan of care are:      None.     Medications impacting care: None.  2) Examination of Body Systems comprised of:   Body structures and functions that impact the plan of care:      Ankle and Shoulder.   Activity limitations that impact the plan of care are:      Lifting and Running.  3) Clinical presentation characteristics are:   Stable/Uncomplicated.  4) Decision-Making    Low complexity using standardized patient assessment instrument and/or   measureable assessment of functional outcome.    Cumulative Therapy Evaluation is: Low complexity.  Previous and current functional  limitations:  (See Goal Flow Sheet for this information)    Short term and Long term goals: (See Goal Flow Sheet for this information)   Communication ability:  Patient appears to be able to clearly communicate and understand verbal and written communication and follow directions correctly.  Treatment Explanation - The following has been discussed with the patient:   RX ordered/plan of care  Anticipated outcomes  Possible risks and side effects  This patient would benefit from PT intervention to resume normal activities.   Rehab potential is good.    Frequency:  1 X week, once daily  Duration:  for 6 weeks tapering to 1 X every other week over 4 weeks  Discharge Plan:  Achieve all LTG.  Independent in home treatment program.  Reach maximal therapeutic benefit.    Please refer to the daily flowsheet for treatment today, total treatment time and time spent performing 1:1 timed codes.     Thank you for your referral.    INQUIRIES  Therapist: Nicola Skelton DPT   [facility]  [address] [place] [id number]  Phone: [phone]  Fax: [phone]

## 2018-01-17 LAB
ALT SERPL W P-5'-P-CCNC: 23 U/L (ref 0–70)
POTASSIUM SERPL-SCNC: 4.6 MMOL/L (ref 3.4–5.3)

## 2018-01-23 ENCOUNTER — THERAPY VISIT (OUTPATIENT)
Dept: PHYSICAL THERAPY | Facility: CLINIC | Age: 48
End: 2018-01-23
Payer: COMMERCIAL

## 2018-01-23 DIAGNOSIS — G89.29 CHRONIC RIGHT SHOULDER PAIN: ICD-10-CM

## 2018-01-23 DIAGNOSIS — M76.62 ACHILLES TENDONITIS, BILATERAL: ICD-10-CM

## 2018-01-23 DIAGNOSIS — M76.61 ACHILLES TENDONITIS, BILATERAL: ICD-10-CM

## 2018-01-23 DIAGNOSIS — M25.511 CHRONIC RIGHT SHOULDER PAIN: ICD-10-CM

## 2018-01-23 PROCEDURE — 97112 NEUROMUSCULAR REEDUCATION: CPT | Mod: GP | Performed by: PHYSICAL THERAPIST

## 2018-01-23 PROCEDURE — 97110 THERAPEUTIC EXERCISES: CPT | Mod: GP | Performed by: PHYSICAL THERAPIST

## 2018-01-30 ENCOUNTER — THERAPY VISIT (OUTPATIENT)
Dept: PHYSICAL THERAPY | Facility: CLINIC | Age: 48
End: 2018-01-30
Payer: COMMERCIAL

## 2018-01-30 DIAGNOSIS — M76.62 ACHILLES TENDONITIS, BILATERAL: ICD-10-CM

## 2018-01-30 DIAGNOSIS — G89.29 CHRONIC RIGHT SHOULDER PAIN: ICD-10-CM

## 2018-01-30 DIAGNOSIS — M25.511 CHRONIC RIGHT SHOULDER PAIN: ICD-10-CM

## 2018-01-30 DIAGNOSIS — M76.61 ACHILLES TENDONITIS, BILATERAL: ICD-10-CM

## 2018-01-30 PROCEDURE — 97110 THERAPEUTIC EXERCISES: CPT | Mod: GP | Performed by: PHYSICAL THERAPIST

## 2018-01-30 PROCEDURE — 97112 NEUROMUSCULAR REEDUCATION: CPT | Mod: GP | Performed by: PHYSICAL THERAPIST

## 2018-02-06 ENCOUNTER — DOCUMENTATION ONLY (OUTPATIENT)
Dept: FAMILY MEDICINE | Facility: CLINIC | Age: 48
End: 2018-02-06

## 2018-02-06 DIAGNOSIS — B35.1 ONYCHOMYCOSIS: Primary | ICD-10-CM

## 2018-02-06 NOTE — PROGRESS NOTES
I think he needs the liver enzyme check. Not sure if that was it. That order was placed.     Can you contact him and ask please?    Pro Velez

## 2018-02-06 NOTE — PROGRESS NOTES
Patient is coming for lab appointment tomorrow morning [id number] and has no orders in the chart.  Please enter orders.  Thank you.

## 2018-02-07 DIAGNOSIS — B35.1 ONYCHOMYCOSIS: ICD-10-CM

## 2018-02-07 LAB — ALT SERPL W P-5'-P-CCNC: 22 U/L (ref 0–70)

## 2018-02-07 PROCEDURE — 84460 ALANINE AMINO (ALT) (SGPT): CPT | Performed by: FAMILY MEDICINE

## 2018-02-07 PROCEDURE — 36415 COLL VENOUS BLD VENIPUNCTURE: CPT | Performed by: FAMILY MEDICINE

## 2018-02-20 ENCOUNTER — THERAPY VISIT (OUTPATIENT)
Dept: PHYSICAL THERAPY | Facility: CLINIC | Age: 48
End: 2018-02-20
Payer: COMMERCIAL

## 2018-02-20 DIAGNOSIS — M25.511 CHRONIC RIGHT SHOULDER PAIN: ICD-10-CM

## 2018-02-20 DIAGNOSIS — M76.61 ACHILLES TENDONITIS, BILATERAL: ICD-10-CM

## 2018-02-20 DIAGNOSIS — G89.29 CHRONIC RIGHT SHOULDER PAIN: ICD-10-CM

## 2018-02-20 DIAGNOSIS — M76.62 ACHILLES TENDONITIS, BILATERAL: ICD-10-CM

## 2018-02-20 PROCEDURE — 97112 NEUROMUSCULAR REEDUCATION: CPT | Mod: GP | Performed by: PHYSICAL THERAPIST

## 2018-02-20 PROCEDURE — 97110 THERAPEUTIC EXERCISES: CPT | Mod: GP | Performed by: PHYSICAL THERAPIST

## 2018-03-08 ENCOUNTER — ALLIED HEALTH/NURSE VISIT (OUTPATIENT)
Dept: NURSING | Facility: CLINIC | Age: 48
End: 2018-03-08
Payer: COMMERCIAL

## 2018-03-08 DIAGNOSIS — Z23 NEED FOR HEPATITIS B VACCINATION: Primary | ICD-10-CM

## 2018-03-08 PROCEDURE — 90746 HEPB VACCINE 3 DOSE ADULT IM: CPT

## 2018-03-08 PROCEDURE — 90471 IMMUNIZATION ADMIN: CPT

## 2018-03-08 PROCEDURE — 99207 ZZC NO CHARGE NURSE ONLY: CPT

## 2018-03-08 ASSESSMENT — ANXIETY QUESTIONNAIRES
6. BECOMING EASILY ANNOYED OR IRRITABLE: NOT AT ALL
1. FEELING NERVOUS, ANXIOUS, OR ON EDGE: NOT AT ALL
2. NOT BEING ABLE TO STOP OR CONTROL WORRYING: NOT AT ALL
3. WORRYING TOO MUCH ABOUT DIFFERENT THINGS: NOT AT ALL
GAD7 TOTAL SCORE: 0
7. FEELING AFRAID AS IF SOMETHING AWFUL MIGHT HAPPEN: NOT AT ALL
5. BEING SO RESTLESS THAT IT IS HARD TO SIT STILL: NOT AT ALL

## 2018-03-08 ASSESSMENT — PATIENT HEALTH QUESTIONNAIRE - PHQ9: 5. POOR APPETITE OR OVEREATING: NOT AT ALL

## 2018-03-08 NOTE — NURSING NOTE
Screening Questionnaire for Adult Immunization     Are you sick today?   No    Do you have allergies to medications, food or any vaccine?   No    Have you ever had a serious reaction after receiving a vaccination?   No    Do you have a long-term health problem with heart disease, lung disease,  asthma, kidney disease, diabetes, anemia, metabolic or blood disease?   No    Do you have cancer, leukemia, AIDS, or any immune system problem?   No    Do you take cortisone, prednisone, other steroids, or anticancer drugs, or  have you had any x-ray (radiation) treatments?   No    Have you had a seizure, brain, or other nervous system problem?   No    During the past year, have you received a transfusion of blood or blood       products, or been given a medicine called immune (gamma) globulin?   No    For women: Are you pregnant or is there a chance you could become         pregnant during the next month?   No    Have you received any vaccinations in the past 4 weeks?   No     Immunization questionnaire answers were all negative.      MNVFC doesn't apply on this patient     Form completed by patient.  Per orders of Vicki Davis, injection(s) of Hep B given by Juaquin Shaw.     Juaquin Shaw MA

## 2018-03-08 NOTE — MR AVS SNAPSHOT
After Visit Summary   3/8/2018    Kalyan Hubbard    MRN: [id number]           Patient Information     Date Of Birth          1970        Visit Information        Provider Department      3/8/2018 8:00 AM HP MEDICAL ASSISTANT [facility]        Today's Diagnoses     Need for hepatitis B vaccination    -  1       Follow-ups after your visit        Your next 10 appointments already scheduled     Mar 13, 2018  7:30 AM CDT   VERNA Running with Nicola Skelton PT   [facility]    [address] [id number] [phone]              Who to contact     If you have questions or need follow up information about today's clinic visit or your schedule please contact [facility] directly at [phone].  Normal or non-critical lab and imaging results will be communicated to you by Exabeamhart, letter or phone within 4 business days after the clinic has received the results. If you do not hear from us within 7 days, please contact the clinic through Exabeamhart or phone. If you have a critical or abnormal lab result, we will notify you by phone as soon as possible.  Submit refill requests through Cinematique or call your pharmacy and they will forward the refill request to us. Please allow 3 business days for your refill to be completed.          Additional Information About Your Visit        MyChart Information     Cinematique gives you secure access to your electronic health record. If you see a primary care provider, you can also send messages to your care team and make appointments. If you have questions, please call your primary care clinic.  If you do not have a primary care provider, please call [phone] and they will assist you.        Care EveryWhere ID     This is your Care EveryWhere ID. This could be used by other organizations to access your [place] medical records  [id number]          Blood Pressure from Last 3 Encounters:   01/05/18 116/79   10/25/16 100/64   09/30/16 110/80    Weight from Last 3 Encounters:   01/05/18 178 lb (80.7 kg)   10/25/16 190 lb (86.2 kg)   09/30/16 196 lb (88.9 kg)              We Performed the Following     ADMIN 1st VACCINE     HEPATITIS B VACCINE, ADULT, IM        Primary Care Provider Office Phone # Fax #    Pro Velez [phone] [address]  [facility] [address]        Equal Access to Services     [facility]: Hadii aad ku hadasho Soomaali, waaxda luqadaha, qaybta kaalmada adeegyada, pari do hayrinn adenikolai ham . So [facility] [phone].    ATENCIÓN: Si habla español, tiene a toussaint disposición servicios gratuitos de asistencia lingüística. [facility] [phone].    We comply with applicable federal civil rights laws and [place] laws. We do not discriminate on the basis of race, color, national origin, age, disability, sex, sexual orientation, or gender identity.            Thank you!     Thank you for choosing [facility]  for your care. Our goal is always to provide you with excellent care. Hearing back from our patients is one way we can continue to improve our services. Please take a few minutes to complete the written survey that you may receive in the mail after your visit with us. Thank you!             Your Updated Medication List - Protect others around you: Learn how to safely use, store and throw away your medicines at www.disposemymeds.org.          This list is accurate as of 3/8/18  8:29 AM.  Always use your most recent med list.                   Brand Name Dispense Instructions for use Diagnosis    sildenafil 20 MG tablet    REVATIO    30 tablet    Take 1-3 tablets (20-60 mg) by mouth daily as needed For pulmonary hypertension.  Never use with nitroglycerin, terazosin or doxazosin.    Erectile dysfunction, unspecified erectile dysfunction type       terbinafine 250 MG tablet    lamISIL     90 tablet    Take 1 tablet (250 mg) by mouth daily    Onychomycosis       valACYclovir 500 MG tablet    VALTREX    90 tablet    Take 1 tablet (500 mg) by mouth daily    Herpes simplex virus infection

## 2018-03-09 ASSESSMENT — ANXIETY QUESTIONNAIRES: GAD7 TOTAL SCORE: 0

## 2018-03-09 ASSESSMENT — PATIENT HEALTH QUESTIONNAIRE - PHQ9: SUM OF ALL RESPONSES TO PHQ QUESTIONS 1-9: 0

## 2018-03-13 ENCOUNTER — THERAPY VISIT (OUTPATIENT)
Dept: PHYSICAL THERAPY | Facility: CLINIC | Age: 48
End: 2018-03-13
Payer: COMMERCIAL

## 2018-03-13 DIAGNOSIS — G89.29 CHRONIC RIGHT SHOULDER PAIN: ICD-10-CM

## 2018-03-13 DIAGNOSIS — M76.62 ACHILLES TENDONITIS, BILATERAL: ICD-10-CM

## 2018-03-13 DIAGNOSIS — M25.511 CHRONIC RIGHT SHOULDER PAIN: ICD-10-CM

## 2018-03-13 DIAGNOSIS — M76.61 ACHILLES TENDONITIS, BILATERAL: ICD-10-CM

## 2018-03-13 PROCEDURE — 97112 NEUROMUSCULAR REEDUCATION: CPT | Mod: GP | Performed by: PHYSICAL THERAPIST

## 2018-03-13 PROCEDURE — 97110 THERAPEUTIC EXERCISES: CPT | Mod: GP | Performed by: PHYSICAL THERAPIST

## 2018-03-13 NOTE — PROGRESS NOTES
[facility] Initial Evaluation  Subjective:  HPI                    Objective:  Standing Alignment:    Cervical/Thoracic:  Forward head  Shoulder/UE:  Rounded shoulders, humeral head anterior L and scapular winging R              Gait:    Gait Type:  Normal   Assistive Devices:  None            Ankle/Foot Evaluation          EDEMA: normal                                                              General     ROS    Assessment/Plan:    DISCHARGE REPORT    Progress reporting period is from 1/16/18 to 3/13/18.       SUBJECTIVE  Subjective changes noted by patient: Patient reports her B Achilles tendons are doing well. Able to run 3-4x/week w/o complaints. Pt has been performing some swimming. His R shdr is about same. He has been inconsistent w/ his R shdr exercises.    Current Pain level: 1-2/10 R shoulder and 0/10 B Achilles tendons.     Initial Pain level: 7/10.   Changes in function:  Yes (See Goal flowsheet attached for changes in current functional level)  Adverse reaction to treatment or activity: None    OBJECTIVE  Changes noted in objective findings:  Yes, see objective findings.  Objective: R shdr AROM: flex WNL -, abd WNL + mid-range, ER 46 -, IR/ext WNL -     ASSESSMENT/PLAN  Updated problem list and treatment plan: Diagnosis 1:  Achilles tendonitis, bilateral  Pain -  hot/cold therapy, manual therapy, splint/taping/bracing/orthotics, self management, education and home program  Decreased ROM/flexibility - manual therapy, therapeutic exercise and home program  Decreased strength - therapeutic exercise, therapeutic activities and home program  Decreased proprioception - neuro re-education, therapeutic activities and home program  Impaired gait - gait training and home program  Impaired muscle performance - neuro re-education and home program  Decreased function - therapeutic activities and home program  Diagnosis 2:  Right shoulder pain   Pain -  hot/cold therapy, manual therapy,  splint/taping/bracing/orthotics, self management, education and home program  Decreased ROM/flexibility - manual therapy, therapeutic exercise and home program  Decreased strength - therapeutic exercise, therapeutic activities and home program  Decreased proprioception - neuro re-education, therapeutic activities and home program  Impaired muscle performance - neuro re-education and home program  Decreased function - therapeutic activities and home program  Impaired posture - neuro re-education and home program  STG/LTGs have been met or progress has been made towards goals:  Yes (See Goal flow sheet completed today.)  Assessment of Progress: The patient's condition is improving.  Self Management Plans:  Patient is independent in a home treatment program.  Patient is independent in self management of symptoms.  I have re-evaluated this patient and find that the nature, scope, duration and intensity of the therapy is appropriate for the medical condition of the patient.  Kalyan continues to require the following intervention to meet STG and LTG's:  PT intervention is no longer required to meet STG/LTG.    Recommendations:  This patient is ready to be discharged from therapy and continue their home treatment program.    Please refer to the daily flowsheet for treatment today, total treatment time and time spent performing 1:1 timed codes.

## 2018-03-13 NOTE — MR AVS SNAPSHOT
After Visit Summary   3/13/2018    Kalyan Hubbard    MRN: [id number]           Patient Information     Date Of Birth          1970        Visit Information        Provider Department      3/13/2018 7:30 AM Nicola Skelton PT [facility]        Today's Diagnoses     Achilles tendonitis, bilateral        Chronic right shoulder pain           Follow-ups after your visit        Who to contact     If you have questions or need follow up information about today's clinic visit or your schedule please contact [facility]LET[facility] PHYSICAL [facility] directly at [phone].  Normal or non-critical lab and imaging results will be communicated to you by RailRunnerhart, letter or phone within 4 business days after the clinic has received the results. If you do not hear from us within 7 days, please contact the clinic through Applied Proteomicst or phone. If you have a critical or abnormal lab result, we will notify you by phone as soon as possible.  Submit refill requests through Splore or call your pharmacy and they will forward the refill request to us. Please allow 3 business days for your refill to be completed.          Additional Information About Your Visit        MyChart Information     Splore gives you secure access to your electronic health record. If you see a primary care provider, you can also send messages to your care team and make appointments. If you have questions, please call your primary care clinic.  If you do not have a primary care provider, please call [phone] and they will assist you.        Care EveryWhere ID     This is your Care EveryWhere ID. This could be used by other organizations to access your [place] medical records  [id number]         Blood Pressure from Last 3 Encounters:   01/05/18 116/79   10/25/16 100/64   09/30/16 110/80    Weight from Last 3 Encounters:   01/05/18 80.7 kg (178 lb)   10/25/16 86.2  kg (190 lb)   09/30/16 88.9 kg (196 lb)              We Performed the Following     VERNA Progress Notes Report     Neuromuscular Re-Education     Therapeutic Exercises        Primary Care Provider Office Phone # Fax #    Pro Velez [phone] [address]  [facility] [address]        Equal Access to Services     GIOVANNI ARRIETA : Hadii aad ku hadasho Soomaali, waaxda luqadaha, qaybta kaalmada adeegyada, waxay idiin hayaan adeeg kharash la'aan . So [facility] [phone].    ATENCIÓN: Si habla español, tiene a toussaint disposición servicios gratuitos de asistencia lingüística. [facility]ilpa[facility] al [phone].    We comply with applicable federal civil rights laws and [place] laws. We do not discriminate on the basis of race, color, national origin, age, disability, sex, sexual orientation, or gender identity.            Thank you!     Thank you for choosing [place] [facility] PH[facility] THERAP[facility]  for your care. Our goal is always to provide you with excellent care. Hearing back from our patients is one way we can continue to improve our services. Please take a few minutes to complete the written survey that you may receive in the mail after your visit with us. Thank you!             Your Updated Medication List - Protect others around you: Learn how to safely use, store and throw away your medicines at www.disposemymeds.org.          This list is accurate as of 3/13/18  1:06 PM.  Always use your most recent med list.                   Brand Name Dispense Instructions for use Diagnosis    sildenafil 20 MG tablet    REVATIO    30 tablet    Take 1-3 tablets (20-60 mg) by mouth daily as needed For pulmonary hypertension.  Never use with nitroglycerin, terazosin or doxazosin.    Erectile dysfunction, unspecified erectile dysfunction type       terbinafine 250 MG tablet    lamISIL    90 tablet    Take 1 tablet (250 mg) by mouth daily    Onychomycosis       valACYclovir 500 MG tablet     VALTREX    90 tablet    Take 1 tablet (500 mg) by mouth daily    Herpes simplex virus infection

## 2019-04-18 ENCOUNTER — OFFICE VISIT (OUTPATIENT)
Dept: FAMILY MEDICINE | Facility: CLINIC | Age: 49
End: 2019-04-18
Payer: COMMERCIAL

## 2019-04-18 VITALS
SYSTOLIC BLOOD PRESSURE: 112 MMHG | HEIGHT: 72 IN | OXYGEN SATURATION: 99 % | DIASTOLIC BLOOD PRESSURE: 75 MMHG | TEMPERATURE: 97.7 F | BODY MASS INDEX: 24.92 KG/M2 | RESPIRATION RATE: 16 BRPM | HEART RATE: 65 BPM | WEIGHT: 184 LBS

## 2019-04-18 DIAGNOSIS — H60.501 ACUTE OTITIS EXTERNA OF RIGHT EAR, UNSPECIFIED TYPE: Primary | ICD-10-CM

## 2019-04-18 PROCEDURE — 99213 OFFICE O/P EST LOW 20 MIN: CPT | Performed by: NURSE PRACTITIONER

## 2019-04-18 RX ORDER — CIPROFLOXACIN AND DEXAMETHASONE 3; 1 MG/ML; MG/ML
4 SUSPENSION/ DROPS AURICULAR (OTIC) 2 TIMES DAILY
Qty: 1 BOTTLE | Refills: 0 | Status: SHIPPED | OUTPATIENT
Start: 2019-04-18 | End: 2019-04-18

## 2019-04-18 RX ORDER — CIPROFLOXACIN AND DEXAMETHASONE 3; 1 MG/ML; MG/ML
4 SUSPENSION/ DROPS AURICULAR (OTIC) 2 TIMES DAILY
Qty: 1 BOTTLE | Refills: 0 | Status: SHIPPED | OUTPATIENT
Start: 2019-04-18 | End: 2019-04-25

## 2019-04-18 ASSESSMENT — MIFFLIN-ST. JEOR: SCORE: 1732.62

## 2019-04-18 ASSESSMENT — ANXIETY QUESTIONNAIRES
1. FEELING NERVOUS, ANXIOUS, OR ON EDGE: NOT AT ALL
2. NOT BEING ABLE TO STOP OR CONTROL WORRYING: NOT AT ALL
7. FEELING AFRAID AS IF SOMETHING AWFUL MIGHT HAPPEN: NOT AT ALL
6. BECOMING EASILY ANNOYED OR IRRITABLE: NOT AT ALL
3. WORRYING TOO MUCH ABOUT DIFFERENT THINGS: NOT AT ALL
GAD7 TOTAL SCORE: 1
IF YOU CHECKED OFF ANY PROBLEMS ON THIS QUESTIONNAIRE, HOW DIFFICULT HAVE THESE PROBLEMS MADE IT FOR YOU TO DO YOUR WORK, TAKE CARE OF THINGS AT HOME, OR GET ALONG WITH OTHER PEOPLE: NOT DIFFICULT AT ALL
5. BEING SO RESTLESS THAT IT IS HARD TO SIT STILL: SEVERAL DAYS

## 2019-04-18 ASSESSMENT — PATIENT HEALTH QUESTIONNAIRE - PHQ9
SUM OF ALL RESPONSES TO PHQ QUESTIONS 1-9: 2
5. POOR APPETITE OR OVEREATING: NOT AT ALL

## 2019-04-18 NOTE — PROGRESS NOTES
"  SUBJECTIVE:   Kalyan Hubbard is a 49 year old male who presents to clinic today for the following   health issues:  Chief Complaint   Patient presents with     Ear Problem       Ear Pain    Duration: 2 weeks    Description (location/character/radiation): kalyan has been having problems with right ear pain, a dull ache that started 2 weeks ago when he started swimming. The pain has been \"up and down\" but today the pain was significantly worse. No fever, chills, headache. No recent head cold. His right ear is painful when he bumps or moves the outer ear.    Intensity:  moderate    Accompanying signs and symptoms: none    History (similar episodes/previous evaluation): 2 years ago    Precipitating or alleviating factors: went swimming last night and this morning was worse    Therapies tried and outcome: None       Additional history: as documented    Reviewed  and updated as needed this visit by clinical staff         Reviewed and updated as needed this visit by Provider         Patient Active Problem List   Diagnosis     Mild recurrent major depression (H)     CARDIOVASCULAR SCREENING; LDL GOAL LESS THAN 160     Past Surgical History:   Procedure Laterality Date     C APPENDECTOMY  1995    no complications, not ruptured     HC TOOTH EXTRACTION W/FORCEP  2001    no complications       Social History     Tobacco Use     Smoking status: Never Smoker     Smokeless tobacco: Never Used   Substance Use Topics     Alcohol use: No     Comment: very rare     Family History   Problem Relation Age of Onset     Diabetes Father         type II DM     C.A.D. Father         65yo ASCVD-stenting     Family History Negative Mother      Family History Negative Brother      Family History Negative Sister      Family History Negative Sister          Current Outpatient Medications   Medication Sig Dispense Refill     sildenafil (REVATIO) 20 MG tablet Take 1-3 tablets (20-60 mg) by mouth daily as needed For pulmonary hypertension.  Never " use with nitroglycerin, terazosin or doxazosin. 30 tablet 11     terbinafine (LAMISIL) 250 MG tablet Take 1 tablet (250 mg) by mouth daily (Patient not taking: Reported on 4/18/2019) 90 tablet 0     valACYclovir (VALTREX) 500 MG tablet Take 1 tablet (500 mg) by mouth daily (Patient not taking: Reported on 4/18/2019) 90 tablet 3     Allergies   Allergen Reactions     Paxil [Paroxetine]      suicidal ideation (age 30), hallucinations     Recent Labs   Lab Test 02/07/18  0823 01/16/18  1418 01/05/18  0843 09/30/16  1029   LDL  --   --  140* 122*   HDL  --   --  47 36*   TRIG  --   --  174* 87   ALT 22 23 25  --    CR  --   --  0.90  --    GFRESTIMATED  --   --  >90  --    GFRESTBLACK  --   --  >90  --    POTASSIUM  --  4.6 5.5*  --       BP Readings from Last 3 Encounters:   04/18/19 112/75   01/05/18 116/79   10/25/16 100/64    Wt Readings from Last 3 Encounters:   04/18/19 83.5 kg (184 lb)   01/05/18 80.7 kg (178 lb)   10/25/16 86.2 kg (190 lb)              Labs reviewed in EPIC    ROS:  Constitutional, HEENT, cardiovascular, pulmonary, GI, , musculoskeletal, neuro, skin, endocrine and psych systems are negative, except as otherwise noted.    OBJECTIVE:     /75 (BP Location: Left arm, Patient Position: Sitting, Cuff Size: Adult Large)   Pulse 65   Temp 97.7  F (36.5  C) (Oral)   Resp 16   Ht 1.829 m (6')   Wt 83.5 kg (184 lb)   SpO2 99%   BMI 24.95 kg/m    Body mass index is 24.95 kg/m .  EXAM:  Constitutional: healthy, alert, active and no distress  Neck: Neck supple. No adenopathy.  ENT: left EAC and TM is normal. Some wax in the canal.  Right EAC is swollen to approximately 50%. He c/o pain with movement of the tragus. The TM is normal and there is a small amount of wax in the canal.   Posterior oropharynx is clear.  Skin: warm and dry  Psychiatric: mentation appears normal. and affect normal/bright      ASSESSMENT/PLAN:     (E50.927) Acute otitis externa of right ear, unspecified type  (primary  encounter diagnosis)  Comment: acute  Plan: ciprofloxacin-dexamethasone (CIPRODEX) 0.3-0.1         % otic suspension          Use the ear drops as prescribed.  I discussed with Kalyan draining water from his canal after swimming, drying his ears, etc.  Return if worsening or if symptoms do not improve.  After the antibiotic ear drop treatment is completed, okay to use OTC swimmer's ear drops PRN.     SHABBIR Napoles [facility]

## 2019-04-18 NOTE — PATIENT INSTRUCTIONS
Patient Education     External Ear Infection (Adult)    External otitis (also called  swimmer s ear ) is an infection in the ear canal. It is often caused by bacteria or fungus. It can occur a few days after water gets trapped in the ear canal (from swimming or bathing). It can also occur after cleaning too deeply in the ear canal with a cotton swab or other object. Sometimes, hair care products get into the ear canal and cause this problem.  Symptoms can include pain, fever, itching, redness, drainage, or swelling of the ear canal. Temporary hearing loss may also occur.  Home care    Do not try to clean the ear canal. This can push pus and bacteria deeper into the canal.    Use prescribed ear drops as directed. These help reduce swelling and fight the infection. If an ear wick was placed in the ear canal, apply drops right onto the end of the wick. The wick will draw the medicine into the ear canal even if it is swollen closed.    A cotton ball may be loosely placed in the outer ear to absorb any drainage.    You may use acetaminophen or ibuprofen to control pain, unless another medicine was prescribed. Note: If you have chronic liver or kidney disease or ever had a stomach ulcer or GI bleeding, talk to your healthcare provider before taking any of these medicines.    Do not allow water to get into your ear when bathing. Also, don't swim until the infection has cleared.  Prevention    Keep your ears dry. This helps lower the risk of infection. Dry your ears with a towel or hair dryer after getting wet. Also, use ear plugs when swimming.    Do not stick any objects in the ear to remove wax.    If you feel water trapped in your ear, use ear drops right away. You can get these drops over the counter at most drugstores. They work by removing water from the ear canal.  Follow-up care  Follow up with your healthcare provider in 1 week, or as advised.  When to seek medical advice  Call your healthcare provider right away  if any of these occur:    Ear pain becomes worse or doesn t improve after 3 days of treatment    Redness or swelling of the outer ear occurs or gets worse    Headache    Painful or stiff neck    Drowsiness or confusion    Fever of 100.4 F (38 C) or higher, or as directed by your healthcare provider    Seizure  Date Last Reviewed: 10/1/2017    0643-8144 [employer]. [address] [place] [address]. All rights reserved. This information is not intended as a substitute for professional medical care. Always follow your healthcare professional's instructions.

## 2019-04-19 ASSESSMENT — ANXIETY QUESTIONNAIRES: GAD7 TOTAL SCORE: 1

## 2019-11-07 ENCOUNTER — HEALTH MAINTENANCE LETTER (OUTPATIENT)
Age: 49
End: 2019-11-07

## 2019-11-16 ENCOUNTER — MYC REFILL (OUTPATIENT)
Dept: FAMILY MEDICINE | Facility: CLINIC | Age: 49
End: 2019-11-16

## 2019-11-16 DIAGNOSIS — N52.9 ERECTILE DYSFUNCTION, UNSPECIFIED ERECTILE DYSFUNCTION TYPE: ICD-10-CM

## 2019-11-16 RX ORDER — SILDENAFIL CITRATE 20 MG/1
20-60 TABLET ORAL DAILY PRN
Qty: 30 TABLET | Refills: 5 | Status: CANCELLED | OUTPATIENT
Start: 2019-11-16

## 2019-11-16 NOTE — TELEPHONE ENCOUNTER
"Requested Prescriptions   Pending Prescriptions Disp Refills     sildenafil (REVATIO) 20 MG tablet 30 tablet 11     Sig: Take 1-3 tablets (20-60 mg) by mouth daily as needed For pulmonary hypertension.  Never use with nitroglycerin, terazosin or doxazosin.         Last Written Prescription Date:  1/5/18  Last Fill Quantity: 30,   # refills: 11  Last Office Visit: 4/18/19  Future Office visit:       Routing refill request to provider for review/approval because:  Directions state use as needed \"for use of pulmonary HTN\"      Erectile Dysfuction Protocol Passed - 11/16/2019  2:37 PM        Passed - Absence of nitrates on medication list        Passed - Absence of Alpha Blockers on Med list        Passed - Recent (12 mo) or future (30 days) visit within the authorizing provider's specialty     Patient has had an office visit with the authorizing provider or a provider within the authorizing providers department within the previous 12 mos or has a future within next 30 days. See \"Patient Info\" tab in inbasket, or \"Choose Columns\" in Meds & Orders section of the refill encounter.              Passed - Medication is active on med list        Passed - Patient is age 18 or older          "

## 2019-11-19 ENCOUNTER — MYC REFILL (OUTPATIENT)
Dept: FAMILY MEDICINE | Facility: CLINIC | Age: 49
End: 2019-11-19

## 2019-11-19 DIAGNOSIS — N52.9 ERECTILE DYSFUNCTION, UNSPECIFIED ERECTILE DYSFUNCTION TYPE: ICD-10-CM

## 2019-11-19 RX ORDER — SILDENAFIL CITRATE 20 MG/1
20-60 TABLET ORAL DAILY PRN
Qty: 30 TABLET | Refills: 11 | Status: CANCELLED | OUTPATIENT
Start: 2019-11-19

## 2019-11-19 NOTE — TELEPHONE ENCOUNTER
Per chart review, on 11/16/2019 refill encounter, patient needs appointment or E-Visit to address this refill request    TechTol Imaginghart message sent to patient    Medication removed. Closing encounter.     Thank You!  Eva Millan RN  Triage Nurse

## 2019-11-19 NOTE — TELEPHONE ENCOUNTER
"Requested Prescriptions   Pending Prescriptions Disp Refills     sildenafil (REVATIO) 20 MG tablet 30 tablet 11     Sig: Take 1-3 tablets (20-60 mg) by mouth daily as needed For pulmonary hypertension.  Never use with nitroglycerin, terazosin or doxazosin.       Erectile Dysfuction Protocol Passed - 11/19/2019  8:00 AM        Passed - Absence of nitrates on medication list        Passed - Absence of Alpha Blockers on Med list        Passed - Recent (12 mo) or future (30 days) visit within the authorizing provider's specialty     Patient has had an office visit with the authorizing provider or a provider within the authorizing providers department within the previous 12 mos or has a future within next 30 days. See \"Patient Info\" tab in inbasket, or \"Choose Columns\" in Meds & Orders section of the refill encounter.              Passed - Medication is active on med list        Passed - Patient is age 18 or older        "

## 2019-11-19 NOTE — TELEPHONE ENCOUNTER
LM to return clinic phone call. Patient is due for follow up visit- Geovany morales.  Sending patient a Scaled Inference message.       Ayah OTERO     [facility]

## 2019-11-19 NOTE — TELEPHONE ENCOUNTER
Appt with Dr. Alvarez on 11/22.    Routing to nurse team due to pending medication.    Nikki Joseph

## 2019-11-22 ENCOUNTER — OFFICE VISIT (OUTPATIENT)
Dept: FAMILY MEDICINE | Facility: CLINIC | Age: 49
End: 2019-11-22
Payer: COMMERCIAL

## 2019-11-22 VITALS
SYSTOLIC BLOOD PRESSURE: 114 MMHG | TEMPERATURE: 98.3 F | HEART RATE: 54 BPM | DIASTOLIC BLOOD PRESSURE: 78 MMHG | RESPIRATION RATE: 14 BRPM | WEIGHT: 187 LBS | HEIGHT: 72 IN | OXYGEN SATURATION: 99 % | BODY MASS INDEX: 25.33 KG/M2

## 2019-11-22 DIAGNOSIS — E78.5 HYPERLIPIDEMIA LDL GOAL <160: ICD-10-CM

## 2019-11-22 DIAGNOSIS — Z12.11 SPECIAL SCREENING FOR MALIGNANT NEOPLASMS, COLON: ICD-10-CM

## 2019-11-22 DIAGNOSIS — Z00.00 ROUTINE GENERAL MEDICAL EXAMINATION AT A HEALTH CARE FACILITY: Primary | ICD-10-CM

## 2019-11-22 DIAGNOSIS — Z82.49 FAMILY HISTORY OF CORONARY ARTERY DISEASE: ICD-10-CM

## 2019-11-22 DIAGNOSIS — Z12.5 SCREENING FOR PROSTATE CANCER: ICD-10-CM

## 2019-11-22 LAB
ALBUMIN SERPL-MCNC: 4.5 G/DL (ref 3.4–5)
ALP SERPL-CCNC: 65 U/L (ref 40–150)
ALT SERPL W P-5'-P-CCNC: 30 U/L (ref 0–70)
ANION GAP SERPL CALCULATED.3IONS-SCNC: 5 MMOL/L (ref 3–14)
AST SERPL W P-5'-P-CCNC: 18 U/L (ref 0–45)
BASOPHILS # BLD AUTO: 0 10E9/L (ref 0–0.2)
BASOPHILS NFR BLD AUTO: 0.3 %
BILIRUB SERPL-MCNC: 0.5 MG/DL (ref 0.2–1.3)
BUN SERPL-MCNC: 18 MG/DL (ref 7–30)
CALCIUM SERPL-MCNC: 10 MG/DL (ref 8.5–10.1)
CHLORIDE SERPL-SCNC: 104 MMOL/L (ref 94–109)
CHOLEST SERPL-MCNC: 257 MG/DL
CO2 SERPL-SCNC: 29 MMOL/L (ref 20–32)
CREAT SERPL-MCNC: 0.95 MG/DL (ref 0.66–1.25)
DIFFERENTIAL METHOD BLD: NORMAL
EOSINOPHIL # BLD AUTO: 0.3 10E9/L (ref 0–0.7)
EOSINOPHIL NFR BLD AUTO: 5.1 %
ERYTHROCYTE [DISTWIDTH] IN BLOOD BY AUTOMATED COUNT: 12.6 % (ref 10–15)
GFR SERPL CREATININE-BSD FRML MDRD: >90 ML/MIN/{1.73_M2}
GLUCOSE SERPL-MCNC: 92 MG/DL (ref 70–99)
HCT VFR BLD AUTO: 47.4 % (ref 40–53)
HDLC SERPL-MCNC: 46 MG/DL
HGB BLD-MCNC: 15.4 G/DL (ref 13.3–17.7)
LDLC SERPL CALC-MCNC: 165 MG/DL
LYMPHOCYTES # BLD AUTO: 2.3 10E9/L (ref 0.8–5.3)
LYMPHOCYTES NFR BLD AUTO: 36.9 %
MCH RBC QN AUTO: 28.5 PG (ref 26.5–33)
MCHC RBC AUTO-ENTMCNC: 32.5 G/DL (ref 31.5–36.5)
MCV RBC AUTO: 88 FL (ref 78–100)
MONOCYTES # BLD AUTO: 0.5 10E9/L (ref 0–1.3)
MONOCYTES NFR BLD AUTO: 7.8 %
NEUTROPHILS # BLD AUTO: 3.1 10E9/L (ref 1.6–8.3)
NEUTROPHILS NFR BLD AUTO: 49.9 %
NONHDLC SERPL-MCNC: 211 MG/DL
PLATELET # BLD AUTO: 225 10E9/L (ref 150–450)
POTASSIUM SERPL-SCNC: 5.2 MMOL/L (ref 3.4–5.3)
PROT SERPL-MCNC: 8 G/DL (ref 6.8–8.8)
PSA SERPL-ACNC: 0.47 UG/L (ref 0–4)
RBC # BLD AUTO: 5.41 10E12/L (ref 4.4–5.9)
SODIUM SERPL-SCNC: 138 MMOL/L (ref 133–144)
TRIGL SERPL-MCNC: 232 MG/DL
WBC # BLD AUTO: 6.1 10E9/L (ref 4–11)

## 2019-11-22 PROCEDURE — 90682 RIV4 VACC RECOMBINANT DNA IM: CPT | Performed by: PREVENTIVE MEDICINE

## 2019-11-22 PROCEDURE — 99396 PREV VISIT EST AGE 40-64: CPT | Mod: 25 | Performed by: PREVENTIVE MEDICINE

## 2019-11-22 PROCEDURE — 90471 IMMUNIZATION ADMIN: CPT | Performed by: PREVENTIVE MEDICINE

## 2019-11-22 PROCEDURE — 80053 COMPREHEN METABOLIC PANEL: CPT | Performed by: PREVENTIVE MEDICINE

## 2019-11-22 PROCEDURE — 36415 COLL VENOUS BLD VENIPUNCTURE: CPT | Performed by: PREVENTIVE MEDICINE

## 2019-11-22 PROCEDURE — 80061 LIPID PANEL: CPT | Performed by: PREVENTIVE MEDICINE

## 2019-11-22 PROCEDURE — 85025 COMPLETE CBC W/AUTO DIFF WBC: CPT | Performed by: PREVENTIVE MEDICINE

## 2019-11-22 PROCEDURE — G0103 PSA SCREENING: HCPCS | Performed by: PREVENTIVE MEDICINE

## 2019-11-22 ASSESSMENT — MIFFLIN-ST. JEOR: SCORE: 1746.23

## 2019-11-22 ASSESSMENT — PATIENT HEALTH QUESTIONNAIRE - PHQ9: SUM OF ALL RESPONSES TO PHQ QUESTIONS 1-9: 0

## 2019-11-22 NOTE — PROGRESS NOTES
SUBJECTIVE:   CC: Kalyan Hubbard is an 49 year old male who presents for preventative health visit.     Healthy Habits:    Getting at least 3 servings of Calcium per day:  Yes    Bi-annual eye exam:  Yes    Dental care twice a year:  Yes    Sleep apnea or symptoms of sleep apnea:  None (snoring-per wife)    Diet:  Regular (no restrictions)    Frequency of exercise:  4-5 days/week    Duration of exercise:  45-60 minutes    Taking medications regularly:  Yes    Barriers to taking medications:  Not applicable    Medication side effects:  Not applicable    PHQ-2 Total Score:    Additional concerns today:  Yes (cholesterol)              Today's PHQ-2 Score:   PHQ-2 ( 1999 [employer]) 1/5/2018   Q1: Little interest or pleasure in doing things 0   Q2: Feeling down, depressed or hopeless 0   PHQ-2 Score 0   Q1: Little interest or pleasure in doing things -   Q2: Feeling down, depressed or hopeless -   PHQ-2 Score -       Abuse: Current or Past(Physical, Sexual or Emotional)- No  Do you feel safe in your environment? Yes        Social History     Tobacco Use     Smoking status: Never Smoker     Smokeless tobacco: Never Used   Substance Use Topics     Alcohol use: No     Comment: very rare         Alcohol Use 9/30/2016   Prescreen: >3 drinks/day or >7 drinks/week? The patient does not drink >3 drinks per day nor >7 drinks per week.       Last PSA: No results found for: PSA    Reviewed orders with patient. Reviewed health maintenance and updated orders accordingly - Yes  Lab work is in process    Reviewed and updated as needed this visit by clinical staff  Allergies  Meds         Reviewed and updated as needed this visit by Provider            Review of Systems  CONSTITUTIONAL: NEGATIVE for fever, chills, change in weight  INTEGUMENTARY/SKIN: NEGATIVE for worrisome rashes, moles or lesions  EYES: NEGATIVE for vision changes or irritation  ENT: NEGATIVE for ear, mouth and throat problems  RESP: NEGATIVE for significant cough or  SOB  CV: NEGATIVE for chest pain, palpitations or peripheral edema  GI: NEGATIVE for nausea, abdominal pain, heartburn, or change in bowel habits   male: negative for dysuria, hematuria, decreased urinary stream, erectile dysfunction, urethral discharge  MUSCULOSKELETAL: NEGATIVE for significant arthralgias or myalgia  NEURO: NEGATIVE for weakness, dizziness or paresthesias  PSYCHIATRIC: NEGATIVE for changes in mood or affect    OBJECTIVE:   There were no vitals taken for this visit.    Physical Exam  GENERAL: healthy, alert and no distress  EYES: Eyes grossly normal to inspection, PERRL and conjunctivae and sclerae normal  HENT: ear canals and TM's normal, nose and mouth without ulcers or lesions  NECK: no adenopathy, no asymmetry, masses, or scars and thyroid normal to palpation  RESP: lungs clear to auscultation - no rales, rhonchi or wheezes  CV: regular rate and rhythm, normal S1 S2, no S3 or S4, no murmur, click or rub, no peripheral edema and peripheral pulses strong  ABDOMEN: soft, nontender, no hepatosplenomegaly, no masses and bowel sounds normal  MS: no gross musculoskeletal defects noted, no edema  SKIN: no suspicious lesions or rashes  NEURO: Normal strength and tone, mentation intact and speech normal  PSYCH: mentation appears normal, affect normal/bright    Diagnostic Test Results:  Labs reviewed in Epic    ASSESSMENT/PLAN:   1. Routine general medical examination at a health care facility    - CBC with platelets and differential  - Comprehensive metabolic panel    2. Hyperlipidemia LDL goal <160    - Lipid panel reflex to direct LDL Fasting    3. Family history of coronary artery disease    - CT Coronary Calcium Scan; Future    4. Special screening for malignant neoplasms, colon    - GASTROENTEROLOGY ADULT REF PROCEDURE ONLY [facility] [phone]; No Provider Preference    5. Screening for prostate cancer    - PSA, screen    COUNSELING:   Reviewed preventive health counseling, as  reflected in patient instructions       Regular exercise       Healthy diet/nutrition       Vision screening       Hearing screening       Family planning       Safe sex practices/STD prevention       Consider Hep C screening for patients born between 1945 and 1965       HIV screeninx in teen years, 1x in adult years, and at intervals if high risk       Colon cancer screening       Prostate cancer screening    Estimated body mass index is 24.95 kg/m  as calculated from the following:    Height as of 19: 1.829 m (6').    Weight as of 19: 83.5 kg (184 lb).          reports that he has never smoked. He has never used smokeless tobacco.      Counseling Resources:  ATP IV Guidelines  Pooled Cohorts Equation Calculator  FRAX Risk Assessment  ICSI Preventive Guidelines  Dietary Guidelines for Americans, 2010  USDA's MyPlate  ASA Prophylaxis  Lung CA Screening    Conner Alvarez MD, MD  [facility]

## 2019-11-26 DIAGNOSIS — N52.9 ERECTILE DYSFUNCTION, UNSPECIFIED ERECTILE DYSFUNCTION TYPE: ICD-10-CM

## 2019-11-26 RX ORDER — SILDENAFIL CITRATE 20 MG/1
20-60 TABLET ORAL DAILY PRN
Qty: 30 TABLET | Refills: 5 | Status: SHIPPED | OUTPATIENT
Start: 2019-11-26 | End: 2021-11-24

## 2019-11-27 NOTE — TELEPHONE ENCOUNTER
"Requested Prescriptions   Pending Prescriptions Disp Refills     sildenafil (REVATIO) 20 MG tablet [Pharmacy Med Name: SILDENAFIL 20MG TAB] 30 tablet 11     Sig: TAKE 1-3 TABLETS (20-60 MG) BY MOUTH DAILY AS NEEDED FOR PULMONARY HYPERTENSION. NEVER USE WITH NITROGLYCERIN, TERAZOSIN OR DOXAZOSIN.       Erectile Dysfuction Protocol Passed - 11/26/2019  6:31 PM        Passed - Absence of nitrates on medication list        Passed - Absence of Alpha Blockers on Med list        Passed - Recent (12 mo) or future (30 days) visit within the authorizing provider's specialty     Patient has had an office visit with the authorizing provider or a provider within the authorizing providers department within the previous 12 mos or has a future within next 30 days. See \"Patient Info\" tab in inbasket, or \"Choose Columns\" in Meds & Orders section of the refill encounter.              Passed - Medication is active on med list        Passed - Patient is age 18 or older        Signed Prescriptions:                        Disp   Refills    sildenafil (REVATIO) 20 MG tablet          30 tab*5        Sig: TAKE 1-3 TABLETS (20-60 MG) BY MOUTH DAILY AS NEEDED           FOR PULMONARY HYPERTENSION. NEVER USE WITH           NITROGLYCERIN, TERAZOSIN OR DOXAZOSIN.  Authorizing Provider: GRACIA WHITE  Ordering User: JOÃO NUNEZ      "

## 2020-02-05 ENCOUNTER — HOSPITAL ENCOUNTER (OUTPATIENT)
Dept: CARDIOLOGY | Facility: CLINIC | Age: 50
Discharge: HOME | End: 2020-02-05
Attending: PREVENTIVE MEDICINE | Admitting: PREVENTIVE MEDICINE
Payer: COMMERCIAL

## 2020-02-05 DIAGNOSIS — Z82.49 FAMILY HISTORY OF CORONARY ARTERY DISEASE: ICD-10-CM

## 2020-02-05 PROCEDURE — 75571 CT HRT W/O DYE W/CA TEST: CPT

## 2020-02-05 PROCEDURE — 75571 CT HRT W/O DYE W/CA TEST: CPT | Mod: 26 | Performed by: INTERNAL MEDICINE

## 2020-02-06 ENCOUNTER — TELEPHONE (OUTPATIENT)
Dept: FAMILY MEDICINE | Facility: CLINIC | Age: 50
End: 2020-02-06

## 2020-02-06 DIAGNOSIS — R91.8 PULMONARY NODULES: Primary | ICD-10-CM

## 2020-02-06 LAB — RADIOLOGIST FLAGS: ABNORMAL

## 2020-02-06 NOTE — TELEPHONE ENCOUNTER
Please call pt to let him know this is a small lung nodule can be caused by previous infection/environmental exposure.  We worry that it could continue to grow.  I would recommend the lung nodule clinic to assess and follow.  I have signed the referral.

## 2020-02-06 NOTE — LETTER
[facility]  [address] [place] [address]  [phone]          February 11, 2020    Kalyan Hubbard                                                                                                                     [address] [facility] [address]            Dear Kalyan -     Following up on your imaging results from the CT scan.  Your coronary calcium score is 0.  This indicates that you are at lower risk of symptomatic coronary artery disease (heart disease).  I think we can continue to observe your cholesterol off medication at this point.  I do advise getting regular cardiovascular exercise and following a healthy diet ongoing.     Additionally, a 4 mm nodules in was seen in both the right and left lung.  A small lung nodule can be caused by previous infection/environmental exposure.  We worry that it could continue to grow.  I would recommend following up with the lung nodule clinic for further assessment.  A referral was placed and you may call the number below to schedule.    Please let us know if you have any questions.      Your provider has referred you to: [facility]: Lung Nodule Clinic - [place] [phone]   [email or web address]    Dr Reymundo Washburn

## 2020-02-06 NOTE — TELEPHONE ENCOUNTER
Kentrell called in from [facility] and has a urgent finding on CT done 2.5.20  Alyssa Casillas  [place]   Patient Rep

## 2020-02-06 NOTE — TELEPHONE ENCOUNTER
Writer called patient left non detailed message requesting return call to clinic and ask to speak with nurse  Result note below-   And advise pt to see note from DR Clark in my chart  As well as give number to clinic below-  Your provider has referred you to: [facility]: Lung Nodule Clinic - [place] [phone]   [email or web address]

## 2020-02-06 NOTE — TELEPHONE ENCOUNTER
Dr. Alvarez-Please review and advise/sign if agree.    Kentrell with [facility] called to report:  1. 2/5/20 CT urgent finding of 4 mm nodule right and left lung-follow up recommended    Lung nodule clinic referral pended.    Thank you!  DEBBY Kaufman, BSN, RN

## 2020-02-11 NOTE — TELEPHONE ENCOUNTER
Writer notes - patient Viewed imaging results by Kalyan Hubbard on 2/7/2020  6:22 PM     Sent letter in mail 2/11/2020 with provider results of CT and referral information

## 2020-02-14 NOTE — TELEPHONE ENCOUNTER
ONCOLOGY INTAKE: Records Information      APPT INFORMATION:  Referring provider:  Pro Velez  Referring provider s clinic:  [facility]  Reason for visit/diagnosis:  Pulmonary Nodules  Has patient been notified of appointment date and time?: Yes    RECORDS INFORMATION:  Were the records received with the referral (via [employer])? No    Has patient been seen for any external appt for this diagnosis? No    If yes, where? NA    Has patient had any imaging or procedures outside of [place] for this condition? No      If Yes, where? NA    ADDITIONAL INFORMATION:  Kalyan had a CT scan on 2/5/20.

## 2020-02-17 NOTE — TELEPHONE ENCOUNTER
RECORDS STATUS - ALL OTHER DIAGNOSIS      RECORDS RECEIVED FROM: [facility] - Internal Referral   DATE RECEIVED: 2/17/20   NOTES STATUS DETAILS   OFFICE NOTE from referring provider Conner Bentley MD   OFFICE NOTE from medical oncologist NA    DISCHARGE SUMMARY from hospital NA    DISCHARGE REPORT from the ER NA    OPERATIVE REPORT NA    MEDICATION LIST [facility]    CLINICAL TRIAL TREATMENTS TO DATE     LABS     PATHOLOGY REPORTS NA    ANYTHING RELATED TO DIAGNOSIS NA    GENONOMIC TESTING     TYPE:     IMAGING (NEED IMAGES & REPORT)     CT SCANS PACS 2/5/20   MRI     MAMMO     ULTRASOUND     PET

## 2020-02-19 ENCOUNTER — ANCILLARY PROCEDURE (OUTPATIENT)
Dept: GENERAL RADIOLOGY | Facility: CLINIC | Age: 50
End: 2020-02-19
Attending: INTERNAL MEDICINE
Payer: COMMERCIAL

## 2020-02-19 ENCOUNTER — OFFICE VISIT (OUTPATIENT)
Dept: FAMILY MEDICINE | Facility: CLINIC | Age: 50
End: 2020-02-19
Payer: COMMERCIAL

## 2020-02-19 VITALS
TEMPERATURE: 97.8 F | BODY MASS INDEX: 25.9 KG/M2 | HEART RATE: 57 BPM | SYSTOLIC BLOOD PRESSURE: 138 MMHG | DIASTOLIC BLOOD PRESSURE: 98 MMHG | WEIGHT: 191 LBS | OXYGEN SATURATION: 100 %

## 2020-02-19 DIAGNOSIS — M54.50 ACUTE LOW BACK PAIN, UNSPECIFIED BACK PAIN LATERALITY, UNSPECIFIED WHETHER SCIATICA PRESENT: ICD-10-CM

## 2020-02-19 DIAGNOSIS — R91.8 PULMONARY NODULES: ICD-10-CM

## 2020-02-19 DIAGNOSIS — F33.0 MILD RECURRENT MAJOR DEPRESSION (H): Primary | ICD-10-CM

## 2020-02-19 PROCEDURE — 99213 OFFICE O/P EST LOW 20 MIN: CPT | Performed by: INTERNAL MEDICINE

## 2020-02-19 PROCEDURE — 72100 X-RAY EXAM L-S SPINE 2/3 VWS: CPT

## 2020-02-19 RX ORDER — METHYLPREDNISOLONE 4 MG
TABLET, DOSE PACK ORAL
Qty: 21 TABLET | Refills: 0 | Status: SHIPPED | OUTPATIENT
Start: 2020-02-19 | End: 2021-11-24

## 2020-02-19 NOTE — PROGRESS NOTES
Subjective     Kalyan Hubbard is a 49 year old male who presents to clinic today for the following health issues:    HPI   Back Pain     Duration: 4 weeks        Specific cause: aggravated while exercising, Notes that his back pain is improving with no radiation to his testicle.     Description:   Location of pain: low back right and groin  Character of pain: sharp, dull ache and constant  Pain radiation:none  New numbness or weakness in legs, not attributed to pain:  no     Intensity: varies    History:   Pain interferes with job: YES  History of back problems: no prior back problems  Any previous MRI or X-rays: None  Sees a specialist for back pain:  No  Therapies tried without relief: acetaminophen (Tylenol), heat and stretch    Alleviating factors:   Improved by: acetaminophen (Tylenol)      Precipitating factors:  Worsened by: Lifting, Bending, Standing, Sitting, Lying Flat, Walking and Coughing    Meagan Ramirez MA      Accompanying Signs & Symptoms:  Risk of Fracture:  None  Risk of Cauda Equina:  None  Risk of Infection:  None  Risk of Cancer:  None  Risk of Ankylosing Spondylitis:  Onset at age <35, male, AND morning back stiffness. no     The pt notes that his right testicle is painful. He inquires if his testicular pain is related to his back pain.     Patient Active Problem List   Diagnosis     Mild recurrent major depression (H)     CARDIOVASCULAR SCREENING; LDL GOAL LESS THAN 160     Past Surgical History:   Procedure Laterality Date     C APPENDECTOMY  1995    no complications, not ruptured     HC TOOTH EXTRACTION W/FORCEP  2001    no complications       Social History     Tobacco Use     Smoking status: Never Smoker     Smokeless tobacco: Never Used   Substance Use Topics     Alcohol use: No     Comment: very rare     Family History   Problem Relation Age of Onset     Diabetes Father         type II DM     C.A.D. Father         65yo ASCVD-stenting     Family History Negative Mother      Family  History Negative Brother      Family History Negative Sister      Family History Negative Sister          Current Outpatient Medications   Medication Sig Dispense Refill     methylPREDNISolone 4 MG PO tablet therapy pack Follow Package Directions 21 tablet 0     sildenafil (REVATIO) 20 MG tablet TAKE 1-3 TABLETS (20-60 MG) BY MOUTH DAILY AS NEEDED FOR PULMONARY HYPERTENSION. NEVER USE WITH NITROGLYCERIN, TERAZOSIN OR DOXAZOSIN. 30 tablet 5     valACYclovir (VALTREX) 500 MG tablet Take 1 tablet (500 mg) by mouth daily 90 tablet 3     Allergies   Allergen Reactions     Paxil [Paroxetine]      suicidal ideation (age 30), hallucinations       Reviewed and updated as needed this visit by Provider         Review of Systems   ROS COMP: Constitutional, HEENT, cardiovascular, pulmonary, gi and gu systems are negative, except as otherwise noted.    This document serves as a record of the services and decisions personally performed and made by Alessandro Alcantar MD. It was created on his behalf by Augustus Morales, a trained medical scribe. The creation of this document is based on the provider's statements to the medical scribe.  Augustus Morales February 19, 2020 11:17 AM          Objective    BP (!) 138/98 (BP Location: Right arm, Cuff Size: Adult Regular)   Pulse 57   Temp 97.8  F (36.6  C) (Oral)   Wt 86.6 kg (191 lb)   SpO2 100%   BMI 25.90 kg/m    Body mass index is 25.9 kg/m .     Physical Exam   Neck was supple without adenopathy or thyromegaly his carotids were normal without bruits  Chest clear to auscultation and percussion  Cardiovascular S1 and S2 are physiologic without murmurs or gallops  Abdomen bowel sounds were normal.  There is no palpable mass or organomegaly  Extremities nontender without any edema  Back tenderness over the superior aspect of the LS associated with minor para spinous radiation. Straight leg normal, hip flexion normal, motor and sensory intact, DTR normal   Pulses pedal pulses are as described  otherwise his pulses are bilaterally symmetrical throughout without bruits  Skin without significant abnormality      Diagnostic Test Results:  Labs reviewed in Epic  Xray - Possible narrowing of the discs within his LS, otherwise normal        Assessment & Plan     Mild recurrent major depression (H)      Acute lo2w back pain, unspecified back pain laterality, unspecified whether sciatica present  Suspect a bulging disc. Rx a Medrol Dosepak and PT to help improve pain. If pain does not improve pt should f/u for possible MRI.   - XR Lumbar Spine 2/3 Views  - methylPREDNISolone 4 MG PO tablet therapy pack  Dispense: 21 tablet; Refill: 0  - [facility], HAND, AND CHIROPRACTIC REFERRAL    Pulmonary nodules           FUTURE APPOINTMENTS:       - Follow-up visit in 1 wk     No follow-ups on file.    The information in this document, created by the medical scribe for me, accurately reflects the services I personally performed and the decisions made by me. I have reviewed and approved this document for accuracy prior to leaving the patient care area.  February 19, 2020 1:00 PM    Alessandro Alcantar MD  [facility]

## 2020-02-24 ENCOUNTER — PRE VISIT (OUTPATIENT)
Dept: PULMONOLOGY | Facility: CLINIC | Age: 50
End: 2020-02-24

## 2020-02-24 ENCOUNTER — OFFICE VISIT (OUTPATIENT)
Dept: PULMONOLOGY | Facility: CLINIC | Age: 50
End: 2020-02-24
Attending: INTERNAL MEDICINE
Payer: COMMERCIAL

## 2020-02-24 VITALS
OXYGEN SATURATION: 97 % | HEIGHT: 72 IN | DIASTOLIC BLOOD PRESSURE: 92 MMHG | SYSTOLIC BLOOD PRESSURE: 129 MMHG | WEIGHT: 189.9 LBS | TEMPERATURE: 97.8 F | BODY MASS INDEX: 25.72 KG/M2 | HEART RATE: 66 BPM | RESPIRATION RATE: 14 BRPM

## 2020-02-24 DIAGNOSIS — R91.8 PULMONARY NODULES: ICD-10-CM

## 2020-02-24 PROBLEM — F33.42 RECURRENT MAJOR DEPRESSION IN FULL REMISSION (H): Status: ACTIVE | Noted: 2020-02-24

## 2020-02-24 PROBLEM — E78.5 HYPERLIPIDEMIA: Status: ACTIVE | Noted: 2020-02-24

## 2020-02-24 PROBLEM — R53.81 OTHER MALAISE AND FATIGUE: Status: ACTIVE | Noted: 2020-02-24

## 2020-02-24 PROBLEM — F52.8 PSYCHOSEXUAL DYSFUNCTION WITH INHIBITED SEXUAL EXCITEMENT: Status: ACTIVE | Noted: 2020-02-24

## 2020-02-24 PROBLEM — R53.83 OTHER MALAISE AND FATIGUE: Status: ACTIVE | Noted: 2020-02-24

## 2020-02-24 PROBLEM — L71.9 ROSACEA: Status: ACTIVE | Noted: 2020-02-24

## 2020-02-24 PROBLEM — F98.8 ATTENTION DEFICIT DISORDER WITHOUT HYPERACTIVITY: Status: ACTIVE | Noted: 2020-02-24

## 2020-02-24 PROCEDURE — G0463 HOSPITAL OUTPT CLINIC VISIT: HCPCS | Mod: ZF

## 2020-02-24 ASSESSMENT — PAIN SCALES - GENERAL: PAINLEVEL: NO PAIN (0)

## 2020-02-24 ASSESSMENT — MIFFLIN-ST. JEOR: SCORE: 1759.51

## 2020-02-24 NOTE — LETTER
2/24/2020       RE: Kalyan Hubbard  [address]  [facility] [address]     Dear Colleague,    Thank you for referring your patient, Kalyan Hubbard, to the [facility] at [facility]. Please see a copy of my visit note below.    LUNG NODULE & INTERVENTIONAL PULMONARY CLINIC  CLINICS & SURGERY [facility], [facility]     Kalyan Hubbard MRN# [id number]   Age: 49 year old YOB: 1970     Reason for Consultation: lung nodule(s)    Requesting Physician: Pro Velez MD  [address] [place] [address]       Assessment and Plan:    1. New multiple pulmonary lung nodule(s). Given the characteristics on current/previous imaging and risk factors; I would classify this to be Low (<6%) risk for cancer.    - no further follow up necessary.           History:     Kalyan Hubbard is a 49 year old male with sig h/o for high cholesterol who is here for evaluation/followup of lung nodule(s).    He had a cardiac CT due to high cholesterol.  Several very small lung nodules were noted.  He denies any respiratory symptoms.  No pain.  No provocative, palliative or localizing factors.  No pertinent positives.  Pertinent negatives are no sputum production or hemoptysis.  No timing component.    - No new resp sx or complaints. Denies dyspnea or cough.     - My interpretation of the images relevant for this visit includes: Very small bilateral pulmonary nodules.  - My interpretation of the PFT's relevant for this visit includes: None     Culprit Nodule(s):   2 solid nodules, one on each side.  In severity they are 4 mm.  No spiculation.           Past Medical History:      Past Medical History:   Diagnosis Date     Depressive disorder, not elsewhere classified 1987    Had tried Nortriptylline, paxil, effexor, wellbutrin. hosp. age 17     Other acne            Past Surgical History:      Past Surgical History:   Procedure  Laterality Date     C APPENDECTOMY  1995    no complications, not ruptured     HC TOOTH EXTRACTION W/FORCEP  2001    no complications          Social History:     Social History     Tobacco Use     Smoking status: Never Smoker     Smokeless tobacco: Never Used   Substance Use Topics     Alcohol use: No     Comment: very rare          Family History:     Family History   Problem Relation Age of Onset     Diabetes Father         type II DM     C.A.D. Father         63yo ASCVD-stenting     Family History Negative Mother      Family History Negative Brother      Family History Negative Sister      Family History Negative Sister            Allergies:      Allergies   Allergen Reactions     Paxil [Paroxetine]      suicidal ideation (age 30), hallucinations          Medications:     Current Outpatient Medications   Medication Sig     methylPREDNISolone 4 MG PO tablet therapy pack Follow Package Directions     sildenafil (REVATIO) 20 MG tablet TAKE 1-3 TABLETS (20-60 MG) BY MOUTH DAILY AS NEEDED FOR PULMONARY HYPERTENSION. NEVER USE WITH NITROGLYCERIN, TERAZOSIN OR DOXAZOSIN.     valACYclovir (VALTREX) 500 MG tablet Take 1 tablet (500 mg) by mouth daily     No current facility-administered medications for this visit.           Review of Systems:     CONSTITUTIONAL: negative for fever, chills, change in weight  INTEGUMENTARY/SKIN: no rash or obvious new lesions  ENT/MOUTH: no sore throat, new sinus pain or nasal drainage  RESP: see interval history  CV: negative for chest pain, palpitations or peripheral edema  GI: no nausea, vomiting, change in stools  : no dysuria  MUSCULOSKELETAL: no myalgias, arthralgias  ENDOCRINE: blood sugars with adequate control  PSYCHIATRIC: mood stable  LYMPHATIC: no new lymphadenopathy  HEME: no bleeding or easy bruisability  NEURO: no numbness, weakness, headaches         Physical Exam:     Temp:  [97.8  F (36.6  C)] 97.8  F (36.6  C)  Pulse:  [66] 66  Resp:  [14] 14  BP: (129)/(92)  129/92  SpO2:  [97 %] 97 %  Wt Readings from Last 4 Encounters:   02/24/20 86.1 kg (189 lb 14.4 oz)   02/19/20 86.6 kg (191 lb)   11/22/19 84.8 kg (187 lb)   04/18/19 83.5 kg (184 lb)     Constitutional:   Awake, alert and in no apparent distress     Eyes:   Nonicteric, ERICK     ENT:    Trachea is midline. No gross neck abnormalities      Neck:   Supple without supraclavicular or cervical lymphadenopathy     Lungs:   Good air flow.  No crackles. No rhonchi.  No wheezes.     Cardiovascular:   Normal S1 and S2.  RRR.  No murmur, gallop or rub.  Radial, DP and PT pulses normal and symmetric     Abdomen:   NABS, soft, nontender, nondistended.  No HSM.     Musculoskeletal:   No edema.      Neurologic:   Alert and conversant. Cranial nerves  intact.       Skin:   Warm, dry.  No rash on limited exam.           Current Laboratory Data:   All laboratory and imaging data reviewed.    Bmp and cbc normal           Again, thank you for allowing me to participate in the care of your patient.      Sincerely,    Jamie Tello MD

## 2020-02-28 ENCOUNTER — THERAPY VISIT (OUTPATIENT)
Dept: PHYSICAL THERAPY | Facility: CLINIC | Age: 50
End: 2020-02-28
Attending: INTERNAL MEDICINE
Payer: COMMERCIAL

## 2020-02-28 DIAGNOSIS — M54.50 ACUTE RIGHT-SIDED LOW BACK PAIN WITHOUT SCIATICA: ICD-10-CM

## 2020-02-28 DIAGNOSIS — M54.50 ACUTE LOW BACK PAIN, UNSPECIFIED BACK PAIN LATERALITY, UNSPECIFIED WHETHER SCIATICA PRESENT: ICD-10-CM

## 2020-02-28 PROCEDURE — 97112 NEUROMUSCULAR REEDUCATION: CPT | Mod: GP | Performed by: PHYSICAL THERAPIST

## 2020-02-28 PROCEDURE — 97110 THERAPEUTIC EXERCISES: CPT | Mod: GP | Performed by: PHYSICAL THERAPIST

## 2020-02-28 PROCEDURE — 97161 PT EVAL LOW COMPLEX 20 MIN: CPT | Mod: GP | Performed by: PHYSICAL THERAPIST

## 2020-02-29 NOTE — PROGRESS NOTES
[facility] Initial Evaluation    Physical Therapy Initial Examination/Evaluation  February 28, 2020    Kalyan Hubbard  is a 49 year old  male referred to physical therapy by Dr. Alcantar for treatment of LBP.      DOI/onset 1-28-20  Mechanism of injury Patient reports a gradual onset of LBP over several days without a specific incident however he feels it is related to weight lifting, running, and swimming.  DOS n/a  Prior treatment none.     Chief Complaint:   Resolving LBP.   Pain location: R LS area. No radicular sx's  Quality: aching  Constant/Intermittent: intermittent  Time of day: worse in AM  Symptoms have improved since onset.    Current pain 1/10.  Pain at best 0/10.  Pain at worst 1/10 currently.    Symptoms aggravated by lifting, swimming, running.    Symptoms improved with rest.     Social history:  Has been working out at the gym 4-5 x week with weights, running, swimming, and biking. He plans to start doing triathalons..    Occupation: .  Job duties:  Computer work, stting.    Patient having difficulty with ADLs: none.    Patient's goals are to resume triathalons, workouts.    Patient reports general health as good.  Related medical history no hx of LBP.    Surgical History:  none.    Imaging: none.    Medications:  none.       Return to MD:  As needed.        Subjective:    Patient Entered Health History - See Therapist Evaluation below         General health as reported by patient is good.          Surgeries include:  Other.        Current occupation is .                     Physical Exam                    Objective:  Standing Alignment:        Lumbar:  Normal  Pelvic:  Normal          Gait:    Gait Type:  Normal         Flexibility/Screens:   Positive screens:  Lumbar    Lower Extremity:  Decreased left lower extremity flexibility:Hip Flexors    Decreased right lower extremity flexibility:  Hip Flexors               Lumbar/SI  Evaluation  ROM:  AROM Lumbar: normal    Strength: R hip abd 4-/5; ext 4-/5  Lumbar Myotomes:  normal                Lumbar Dermtomes:  normal                Neural Tension/Mobility:      Left side:SLR; Slump or Vinnei-Lumbar  negative.     Right side:   Slump; Vinnie-Lumbar or SLR  negative.   Lumbar Palpation:  Palpation (lumbar): MF tightness in R QL.    Tenderness present at Right: Quadratus Lumborum; Erector Spinae and Piriformis      Spinal Segmental Conclusions: No pain with P/A glide lumbar spine          SI joint/Sacrum:    SI joint provocation tests (-)                                                       General     ROS    Assessment/Plan:    Patient is a 49 year old male with lumbar complaints.    Patient has the following significant findings with corresponding treatment plan.                Diagnosis 1:  LBP  Pain -  self management and education  Decreased ROM/flexibility - manual therapy and therapeutic exercise  Decreased strength - therapeutic exercise and therapeutic activities  Impaired muscle performance - neuro re-education  Decreased function - therapeutic activities    Therapy Evaluation Codes:   1) History comprised of:   Personal factors that impact the plan of care:      None.    Comorbidity factors that impact the plan of care are:      None.     Medications impacting care: None.  2) Examination of Body Systems comprised of:   Body structures and functions that impact the plan of care:      Lumbar spine.   Activity limitations that impact the plan of care are:      Lifting.  3) Clinical presentation characteristics are:   Stable/Uncomplicated.  4) Decision-Making    Low complexity using standardized patient assessment instrument and/or measureable assessment of functional outcome.  Cumulative Therapy Evaluation is: Low complexity.    Previous and current functional limitations:  (See Goal Flow Sheet for this information)    Short term and Long term goals: (See Goal Flow Sheet for this  information)     Communication ability:  Patient appears to be able to clearly communicate and understand verbal and written communication and follow directions correctly.  Treatment Explanation - The following has been discussed with the patient:   RX ordered/plan of care  Anticipated outcomes  Possible risks and side effects  This patient would benefit from PT intervention to resume normal activities.   Rehab potential is good.    Frequency:  2 X a month, once daily  Duration:  for 1 month  Discharge Plan:  Achieve all LTG.  Independent in home treatment program.  Reach maximal therapeutic benefit.    Please refer to the daily flowsheet for treatment today, total treatment time and time spent performing 1:1 timed codes.

## 2020-03-02 PROBLEM — M54.50 ACUTE RIGHT-SIDED LOW BACK PAIN WITHOUT SCIATICA: Status: ACTIVE | Noted: 2020-03-02

## 2020-04-14 PROBLEM — M54.50 ACUTE RIGHT-SIDED LOW BACK PAIN WITHOUT SCIATICA: Status: RESOLVED | Noted: 2020-03-02 | Resolved: 2020-04-14

## 2021-01-15 ENCOUNTER — HEALTH MAINTENANCE LETTER (OUTPATIENT)
Age: 51
End: 2021-01-15

## 2021-04-08 ENCOUNTER — IMMUNIZATION (OUTPATIENT)
Dept: LAB | Facility: CLINIC | Age: 51
End: 2021-04-08
Payer: COMMERCIAL

## 2021-09-25 ENCOUNTER — HEALTH MAINTENANCE LETTER (OUTPATIENT)
Age: 51
End: 2021-09-25

## 2021-11-11 ENCOUNTER — IMMUNIZATION (OUTPATIENT)
Dept: FAMILY MEDICINE | Facility: CLINIC | Age: 51
End: 2021-11-11
Payer: COMMERCIAL

## 2021-11-11 DIAGNOSIS — Z23 NEED FOR PROPHYLACTIC VACCINATION AND INOCULATION AGAINST INFLUENZA: Primary | ICD-10-CM

## 2021-11-11 PROCEDURE — 90682 RIV4 VACC RECOMBINANT DNA IM: CPT

## 2021-11-11 PROCEDURE — 99207 PR NO CHARGE NURSE ONLY: CPT

## 2021-11-11 PROCEDURE — 90471 IMMUNIZATION ADMIN: CPT

## 2021-11-18 ENCOUNTER — IMMUNIZATION (OUTPATIENT)
Dept: NURSING | Facility: CLINIC | Age: 51
End: 2021-11-18
Payer: COMMERCIAL

## 2021-11-18 PROCEDURE — 0064A COVID-19,PF,MODERNA (18+ YRS BOOSTER .25ML): CPT

## 2021-11-18 PROCEDURE — 91306 COVID-19,PF,MODERNA (18+ YRS BOOSTER .25ML): CPT

## 2021-11-24 ENCOUNTER — OFFICE VISIT (OUTPATIENT)
Dept: INTERNAL MEDICINE | Facility: CLINIC | Age: 51
End: 2021-11-24
Payer: COMMERCIAL

## 2021-11-24 VITALS
HEART RATE: 66 BPM | DIASTOLIC BLOOD PRESSURE: 74 MMHG | WEIGHT: 185 LBS | OXYGEN SATURATION: 100 % | BODY MASS INDEX: 26.48 KG/M2 | HEIGHT: 70 IN | SYSTOLIC BLOOD PRESSURE: 118 MMHG

## 2021-11-24 DIAGNOSIS — Z12.5 SCREENING FOR PROSTATE CANCER: ICD-10-CM

## 2021-11-24 DIAGNOSIS — Z12.11 SCREEN FOR COLON CANCER: ICD-10-CM

## 2021-11-24 DIAGNOSIS — F33.0 MILD RECURRENT MAJOR DEPRESSION (H): ICD-10-CM

## 2021-11-24 DIAGNOSIS — N52.9 ERECTILE DYSFUNCTION, UNSPECIFIED ERECTILE DYSFUNCTION TYPE: ICD-10-CM

## 2021-11-24 DIAGNOSIS — Z13.1 SCREENING FOR DIABETES MELLITUS: ICD-10-CM

## 2021-11-24 DIAGNOSIS — Z00.00 ANNUAL PHYSICAL EXAM: Primary | ICD-10-CM

## 2021-11-24 DIAGNOSIS — E78.2 MIXED HYPERLIPIDEMIA: ICD-10-CM

## 2021-11-24 DIAGNOSIS — D64.9 NORMOCYTIC ANEMIA: ICD-10-CM

## 2021-11-24 DIAGNOSIS — Z11.59 NEED FOR HEPATITIS C SCREENING TEST: ICD-10-CM

## 2021-11-24 PROBLEM — R53.81 OTHER MALAISE AND FATIGUE: Status: RESOLVED | Noted: 2020-02-24 | Resolved: 2021-11-24

## 2021-11-24 PROBLEM — F33.42 RECURRENT MAJOR DEPRESSION IN FULL REMISSION (H): Status: RESOLVED | Noted: 2020-02-24 | Resolved: 2021-11-24

## 2021-11-24 PROBLEM — N52.8 OTHER MALE ERECTILE DYSFUNCTION: Status: ACTIVE | Noted: 2020-02-24

## 2021-11-24 PROBLEM — F98.8 ATTENTION DEFICIT DISORDER WITHOUT HYPERACTIVITY: Status: RESOLVED | Noted: 2020-02-24 | Resolved: 2021-11-24

## 2021-11-24 PROBLEM — R53.83 OTHER MALAISE AND FATIGUE: Status: RESOLVED | Noted: 2020-02-24 | Resolved: 2021-11-24

## 2021-11-24 PROCEDURE — 99396 PREV VISIT EST AGE 40-64: CPT | Performed by: INTERNAL MEDICINE

## 2021-11-24 RX ORDER — SILDENAFIL CITRATE 20 MG/1
20-60 TABLET ORAL DAILY PRN
Qty: 30 TABLET | Refills: 5 | Status: SHIPPED | OUTPATIENT
Start: 2021-11-24 | End: 2024-06-19

## 2021-11-24 ASSESSMENT — MIFFLIN-ST. JEOR: SCORE: 1695.4

## 2021-11-24 NOTE — PROGRESS NOTES
Office Visit - Physical   Kalyan DOE Hubbard   51 year old  male    Date of visit: 11/24/2021  Physician: Darrin Leon MD, MD     Assessment and Plan   1. Annual physical exam  Is a generally healthy 51-year-old man.  Immunizations are up-to-date with the exception of shingles vaccine.  He will look into his insurance and I recommended this vaccination    2. Screen for colon cancer  - Adult Gastro Ref - Procedure Only; Future      4. Erectile dysfunction, unspecified erectile dysfunction type  - sildenafil (REVATIO) 20 MG tablet; Take 1-3 tablets (20-60 mg) by mouth daily as needed (ED)  Dispense: 30 tablet; Refill: 5    5. Mixed hyperlipidemia  CT calcium score is 0, consider repeating at age 59  - CBC with platelets; Future  - Comprehensive metabolic panel; Future  - Lipid panel reflex to direct LDL Fasting; Future    6. Mild recurrent major depression (H)  Stable    7. Screening for prostate cancer  - Prostate Specific Antigen Screen; Future    8. Screening for diabetes mellitus  - Hemoglobin A1c; Future      Return in about 1 year (around 11/24/2022) for Routine preventive.     Chief Complaint   Chief Complaint   Patient presents with     Physical     not fasting        Patient Profile   Social History     Social History Narrative    Lives with his wife, Dorcas.  Works as a  for [employer].  Dorcas works as a faculty development office at [employer].        Past Medical History   Patient Active Problem List   Diagnosis     Mild recurrent major depression (H)     Mixed hyperlipidemia     Erectile dysfunction, unspecified erectile dysfunction type     Rosacea       Past Surgical History  He has a past surgical history that includes APPENDECTOMY (01/01/1995) and TOOTH EXTRACTION W/FORCEP (01/01/2001).     History of Present Illness   This 51 year old old man comes in for annual physical.  Overall doing well.  Reviewed past labs imaging studies etc.    Review of Systems: A  "comprehensive review of systems was negative except as noted.     Medications and Allergies   Current Outpatient Medications   Medication Sig Dispense Refill     sildenafil (REVATIO) 20 MG tablet Take 1-3 tablets (20-60 mg) by mouth daily as needed (ED) 30 tablet 5     Allergies   Allergen Reactions     Paxil [Paroxetine]      suicidal ideation (age 30), hallucinations        Family and Social History   Family History   Problem Relation Age of Onset     No Known Problems Mother      Diabetes Father         type II DM     C.A.D. Father         63yo ASCVD-stenting     No Known Problems Sister      No Known Problems Sister      No Known Problems Brother         Social History     Tobacco Use     Smoking status: Never Smoker     Smokeless tobacco: Never Used   Substance Use Topics     Alcohol use: No     Comment: very rare     Drug use: No        Physical Exam   General Appearance:   No acute distress    /74 (BP Location: Left arm, Patient Position: Sitting, Cuff Size: Adult Regular)   Pulse 66   Ht 1.778 m (5' 10\")   Wt 83.9 kg (185 lb)   SpO2 100%   BMI 26.54 kg/m      EYES: Eyelids, conjunctiva, and sclera were normal. Pupils were normal. Cornea, iris, and lens were normal bilaterally.  HEAD, EARS, NOSE, MOUTH, AND THROAT: Head and face were normal. Hearing was normal to voice and the ears were normal to external exam. Nose appearance was normal and there was no discharge. Oropharynx was normal.  NECK: Neck appearance was normal. There were no neck masses and the thyroid was not enlarged.  RESPIRATORY: Breathing pattern was normal and the chest moved symmetrically.  Percussion/auscultatory percussion was normal.  Lung sounds were normal and there were no abnormal sounds.  CARDIOVASCULAR: Heart rate and rhythm were normal.  S1 and S2 were normal and there were no extra sounds or murmurs. Peripheral pulses in arms and legs were normal.  Jugular venous pressure was normal.  There was no peripheral " edema.  GASTROINTESTINAL: The abdomen was normal in contour.  Bowel sounds were present.  Percussion detected no organ enlargement or tenderness.  Palpation detected no tenderness, mass, or enlarged organs.   MUSCULOSKELETAL: Skeletal configuration was normal and muscle mass was normal for age. Joint appearance was overall normal.  LYMPHATIC: There were no enlarged nodes.  SKIN/HAIR/NAILS: Skin color was normal.  There were no skin lesions.  Hair and nails were normal.  NEUROLOGIC: The patient was alert and oriented to person, place, time, and circumstance. Speech was normal. Cranial nerves were normal. Motor strength was normal for age. The patient was normally coordinated.  PSYCHIATRIC:  Mood and affect were normal and the patient had normal recent and remote memory. The patient's judgment and insight were normal.       Additional Information        Darrin Leon MD, MD  Internal Medicine  Contact me at [phone]  Answers for HPI/ROS submitted by the patient on 11/24/2021  Frequency of exercise:: 2-3 days/week  Getting at least 3 servings of Calcium per day:: Yes  Diet:: Regular (no restrictions)  Taking medications regularly:: Yes  Medication side effects:: None  Bi-annual eye exam:: Yes  Dental care twice a year:: Yes  Sleep apnea or symptoms of sleep apnea:: None  Additional concerns today:: No  Duration of exercise:: 30-45 minutes

## 2021-11-30 ENCOUNTER — LAB (OUTPATIENT)
Dept: LAB | Facility: CLINIC | Age: 51
End: 2021-11-30
Payer: COMMERCIAL

## 2021-11-30 DIAGNOSIS — Z12.5 SCREENING FOR PROSTATE CANCER: ICD-10-CM

## 2021-11-30 DIAGNOSIS — E78.2 MIXED HYPERLIPIDEMIA: ICD-10-CM

## 2021-11-30 DIAGNOSIS — Z13.1 SCREENING FOR DIABETES MELLITUS: ICD-10-CM

## 2021-11-30 LAB
ALBUMIN SERPL-MCNC: 4.2 G/DL (ref 3.5–5)
ALP SERPL-CCNC: 63 U/L (ref 45–120)
ALT SERPL W P-5'-P-CCNC: 16 U/L (ref 0–45)
ANION GAP SERPL CALCULATED.3IONS-SCNC: 11 MMOL/L (ref 5–18)
AST SERPL W P-5'-P-CCNC: 16 U/L (ref 0–40)
BILIRUB SERPL-MCNC: 0.7 MG/DL (ref 0–1)
BUN SERPL-MCNC: 14 MG/DL (ref 8–22)
CALCIUM SERPL-MCNC: 9.7 MG/DL (ref 8.5–10.5)
CHLORIDE BLD-SCNC: 104 MMOL/L (ref 98–107)
CHOLEST SERPL-MCNC: 209 MG/DL
CO2 SERPL-SCNC: 25 MMOL/L (ref 22–31)
CREAT SERPL-MCNC: 0.98 MG/DL (ref 0.7–1.3)
ERYTHROCYTE [DISTWIDTH] IN BLOOD BY AUTOMATED COUNT: 12.8 % (ref 10–15)
FASTING STATUS PATIENT QL REPORTED: YES
GFR SERPL CREATININE-BSD FRML MDRD: 88 ML/MIN/1.73M2
GLUCOSE BLD-MCNC: 89 MG/DL (ref 70–125)
HBA1C MFR BLD: 5.4 % (ref 0–5.6)
HCT VFR BLD AUTO: 42.8 % (ref 40–53)
HDLC SERPL-MCNC: 45 MG/DL
HGB BLD-MCNC: 13.2 G/DL (ref 13.3–17.7)
LDLC SERPL CALC-MCNC: 141 MG/DL
MCH RBC QN AUTO: 26.1 PG (ref 26.5–33)
MCHC RBC AUTO-ENTMCNC: 30.8 G/DL (ref 31.5–36.5)
MCV RBC AUTO: 85 FL (ref 78–100)
PLATELET # BLD AUTO: 277 10E3/UL (ref 150–450)
POTASSIUM BLD-SCNC: 4.7 MMOL/L (ref 3.5–5)
PROT SERPL-MCNC: 7 G/DL (ref 6–8)
PSA SERPL-MCNC: 0.53 UG/L (ref 0–3.5)
RBC # BLD AUTO: 5.05 10E6/UL (ref 4.4–5.9)
SODIUM SERPL-SCNC: 140 MMOL/L (ref 136–145)
TRIGL SERPL-MCNC: 115 MG/DL
WBC # BLD AUTO: 5.3 10E3/UL (ref 4–11)

## 2021-11-30 PROCEDURE — 85027 COMPLETE CBC AUTOMATED: CPT

## 2021-11-30 PROCEDURE — G0103 PSA SCREENING: HCPCS

## 2021-11-30 PROCEDURE — 36415 COLL VENOUS BLD VENIPUNCTURE: CPT

## 2021-11-30 PROCEDURE — 80061 LIPID PANEL: CPT

## 2021-11-30 PROCEDURE — 83036 HEMOGLOBIN GLYCOSYLATED A1C: CPT

## 2021-11-30 PROCEDURE — 80053 COMPREHEN METABOLIC PANEL: CPT

## 2022-02-02 ENCOUNTER — TRANSFERRED RECORDS (OUTPATIENT)
Dept: HEALTH INFORMATION MANAGEMENT | Facility: CLINIC | Age: 52
End: 2022-02-02
Payer: COMMERCIAL

## 2022-09-30 ENCOUNTER — ALLIED HEALTH/NURSE VISIT (OUTPATIENT)
Dept: FAMILY MEDICINE | Facility: CLINIC | Age: 52
End: 2022-09-30
Payer: COMMERCIAL

## 2022-09-30 DIAGNOSIS — Z23 NEED FOR SHINGLES VACCINE: Primary | ICD-10-CM

## 2022-09-30 PROCEDURE — 90471 IMMUNIZATION ADMIN: CPT

## 2022-09-30 PROCEDURE — 99207 PR NO CHARGE NURSE ONLY: CPT

## 2022-09-30 PROCEDURE — 90750 HZV VACC RECOMBINANT IM: CPT

## 2022-09-30 NOTE — PROGRESS NOTES
Prior to immunization administration, verified patients identity using patient s name and date of birth. Please see Immunization Activity for additional information.     Screening Questionnaire for Adult Immunization    Are you sick today?   No   Do you have allergies to medications, food, a vaccine component or latex?   No   Have you ever had a serious reaction after receiving a vaccination?   No   Do you have a long-term health problem with heart, lung, kidney, or metabolic disease (e.g., diabetes), asthma, a blood disorder, no spleen, complement component deficiency, a cochlear implant, or a spinal fluid leak?  Are you on long-term aspirin therapy?   No   Do you have cancer, leukemia, HIV/AIDS, or any other immune system problem?   No   Do you have a parent, brother, or sister with an immune system problem?   No   In the past 3 months, have you taken medications that affect  your immune system, such as prednisone, other steroids, or anticancer drugs; drugs for the treatment of rheumatoid arthritis, Crohn s disease, or psoriasis; or have you had radiation treatments?   No   Have you had a seizure, or a brain or other nervous system problem?   No   During the past year, have you received a transfusion of blood or blood    products, or been given immune (gamma) globulin or antiviral drug?   No   For women: Are you pregnant or is there a chance you could become       pregnant during the next month?   No   Have you received any vaccinations in the past 4 weeks?   No     Immunization questionnaire answers were all negative.        Per orders of Dr. Shultz, injection of Shingrix given by Joyce Michelle CMA. Patient instructed to remain in clinic for 15 minutes afterwards, and to report any adverse reaction to me immediately.       Screening performed by Joyce Michelle CMA on 9/30/2022 at 3:58 PM.

## 2022-10-08 ENCOUNTER — IMMUNIZATION (OUTPATIENT)
Dept: PEDIATRICS | Facility: CLINIC | Age: 52
End: 2022-10-08
Payer: COMMERCIAL

## 2022-10-08 DIAGNOSIS — Z23 NEED FOR PROPHYLACTIC VACCINATION AND INOCULATION AGAINST INFLUENZA: Primary | ICD-10-CM

## 2022-10-08 PROCEDURE — 90471 IMMUNIZATION ADMIN: CPT

## 2022-10-08 PROCEDURE — 99207 PR NO CHARGE NURSE ONLY: CPT

## 2022-10-08 PROCEDURE — 90682 RIV4 VACC RECOMBINANT DNA IM: CPT

## 2022-10-14 ENCOUNTER — IMMUNIZATION (OUTPATIENT)
Dept: NURSING | Facility: CLINIC | Age: 52
End: 2022-10-14
Payer: COMMERCIAL

## 2022-10-14 PROCEDURE — 0134A COVID-19,PF,MODERNA BIVALENT: CPT

## 2022-10-14 PROCEDURE — 91313 COVID-19,PF,MODERNA BIVALENT: CPT

## 2022-12-25 ENCOUNTER — HEALTH MAINTENANCE LETTER (OUTPATIENT)
Age: 52
End: 2022-12-25

## 2023-04-12 ENCOUNTER — OFFICE VISIT (OUTPATIENT)
Dept: INTERNAL MEDICINE | Facility: CLINIC | Age: 53
End: 2023-04-12
Payer: COMMERCIAL

## 2023-04-12 VITALS
BODY MASS INDEX: 25.73 KG/M2 | TEMPERATURE: 97.9 F | DIASTOLIC BLOOD PRESSURE: 74 MMHG | HEIGHT: 72 IN | WEIGHT: 190 LBS | SYSTOLIC BLOOD PRESSURE: 122 MMHG | RESPIRATION RATE: 18 BRPM | OXYGEN SATURATION: 98 % | HEART RATE: 57 BPM

## 2023-04-12 DIAGNOSIS — Z00.00 ANNUAL PHYSICAL EXAM: Primary | ICD-10-CM

## 2023-04-12 DIAGNOSIS — B35.1 TOENAIL FUNGUS: ICD-10-CM

## 2023-04-12 DIAGNOSIS — L98.9 SKIN LESION: ICD-10-CM

## 2023-04-12 DIAGNOSIS — D64.9 NORMOCYTIC ANEMIA: ICD-10-CM

## 2023-04-12 DIAGNOSIS — N52.9 ERECTILE DYSFUNCTION, UNSPECIFIED ERECTILE DYSFUNCTION TYPE: ICD-10-CM

## 2023-04-12 DIAGNOSIS — F33.0 MILD RECURRENT MAJOR DEPRESSION (H): ICD-10-CM

## 2023-04-12 DIAGNOSIS — I65.23 CAROTID ARTERY CALCIFICATION, BILATERAL: ICD-10-CM

## 2023-04-12 DIAGNOSIS — J02.9 SORE THROAT: ICD-10-CM

## 2023-04-12 DIAGNOSIS — Z13.1 SCREENING FOR DIABETES MELLITUS: ICD-10-CM

## 2023-04-12 DIAGNOSIS — Z12.5 SCREENING FOR PROSTATE CANCER: ICD-10-CM

## 2023-04-12 DIAGNOSIS — E78.2 MIXED HYPERLIPIDEMIA: ICD-10-CM

## 2023-04-12 LAB
ALBUMIN SERPL BCG-MCNC: 4.8 G/DL (ref 3.5–5.2)
ALBUMIN UR-MCNC: NEGATIVE MG/DL
ALP SERPL-CCNC: 54 U/L (ref 40–129)
ALT SERPL W P-5'-P-CCNC: 32 U/L (ref 10–50)
ANION GAP SERPL CALCULATED.3IONS-SCNC: 14 MMOL/L (ref 7–15)
APPEARANCE UR: CLEAR
AST SERPL W P-5'-P-CCNC: 24 U/L (ref 10–50)
BASOPHILS # BLD AUTO: 0 10E3/UL (ref 0–0.2)
BASOPHILS NFR BLD AUTO: 0 %
BILIRUB SERPL-MCNC: 0.6 MG/DL
BILIRUB UR QL STRIP: NEGATIVE
BUN SERPL-MCNC: 12.4 MG/DL (ref 6–20)
CALCIUM SERPL-MCNC: 10.1 MG/DL (ref 8.6–10)
CHLORIDE SERPL-SCNC: 101 MMOL/L (ref 98–107)
CHOLEST SERPL-MCNC: 229 MG/DL
COLOR UR AUTO: YELLOW
CREAT SERPL-MCNC: 1.13 MG/DL (ref 0.67–1.17)
DEPRECATED HCO3 PLAS-SCNC: 25 MMOL/L (ref 22–29)
EOSINOPHIL # BLD AUTO: 0.1 10E3/UL (ref 0–0.7)
EOSINOPHIL NFR BLD AUTO: 3 %
ERYTHROCYTE [DISTWIDTH] IN BLOOD BY AUTOMATED COUNT: 12.1 % (ref 10–15)
FERRITIN SERPL-MCNC: 16 NG/ML (ref 31–409)
FOLATE SERPL-MCNC: 11.8 NG/ML (ref 4.6–34.8)
GFR SERPL CREATININE-BSD FRML MDRD: 77 ML/MIN/1.73M2
GLUCOSE SERPL-MCNC: 91 MG/DL (ref 70–99)
GLUCOSE UR STRIP-MCNC: NEGATIVE MG/DL
HBA1C MFR BLD: 5.4 % (ref 0–5.6)
HCT VFR BLD AUTO: 42.2 % (ref 40–53)
HDLC SERPL-MCNC: 49 MG/DL
HGB BLD-MCNC: 13.5 G/DL (ref 13.3–17.7)
HGB UR QL STRIP: NEGATIVE
IMM GRANULOCYTES # BLD: 0 10E3/UL
IMM GRANULOCYTES NFR BLD: 0 %
KETONES UR STRIP-MCNC: NEGATIVE MG/DL
LDLC SERPL CALC-MCNC: 164 MG/DL
LEUKOCYTE ESTERASE UR QL STRIP: NEGATIVE
LYMPHOCYTES # BLD AUTO: 1.6 10E3/UL (ref 0.8–5.3)
LYMPHOCYTES NFR BLD AUTO: 32 %
MCH RBC QN AUTO: 27.9 PG (ref 26.5–33)
MCHC RBC AUTO-ENTMCNC: 32 G/DL (ref 31.5–36.5)
MCV RBC AUTO: 87 FL (ref 78–100)
MONOCYTES # BLD AUTO: 0.4 10E3/UL (ref 0–1.3)
MONOCYTES NFR BLD AUTO: 8 %
NEUTROPHILS # BLD AUTO: 2.8 10E3/UL (ref 1.6–8.3)
NEUTROPHILS NFR BLD AUTO: 56 %
NITRATE UR QL: NEGATIVE
NONHDLC SERPL-MCNC: 180 MG/DL
PH UR STRIP: 7.5 [PH] (ref 5–8)
PLATELET # BLD AUTO: 241 10E3/UL (ref 150–450)
POTASSIUM SERPL-SCNC: 5 MMOL/L (ref 3.4–5.3)
PROT SERPL-MCNC: 7.4 G/DL (ref 6.4–8.3)
PSA SERPL DL<=0.01 NG/ML-MCNC: 0.47 NG/ML (ref 0–3.5)
RBC # BLD AUTO: 4.84 10E6/UL (ref 4.4–5.9)
RETICS # AUTO: 0.06 10E6/UL (ref 0.01–0.11)
RETICS/RBC NFR AUTO: 1.2 % (ref 0.8–2.7)
SODIUM SERPL-SCNC: 140 MMOL/L (ref 136–145)
SP GR UR STRIP: 1.01 (ref 1–1.03)
TRIGL SERPL-MCNC: 78 MG/DL
TSH SERPL DL<=0.005 MIU/L-ACNC: 2.12 UIU/ML (ref 0.3–4.2)
UROBILINOGEN UR STRIP-ACNC: 0.2 E.U./DL
VIT B12 SERPL-MCNC: 513 PG/ML (ref 232–1245)
WBC # BLD AUTO: 5 10E3/UL (ref 4–11)

## 2023-04-12 PROCEDURE — 81003 URINALYSIS AUTO W/O SCOPE: CPT | Performed by: INTERNAL MEDICINE

## 2023-04-12 PROCEDURE — 83036 HEMOGLOBIN GLYCOSYLATED A1C: CPT | Performed by: INTERNAL MEDICINE

## 2023-04-12 PROCEDURE — 99396 PREV VISIT EST AGE 40-64: CPT | Performed by: INTERNAL MEDICINE

## 2023-04-12 PROCEDURE — 82746 ASSAY OF FOLIC ACID SERUM: CPT | Performed by: INTERNAL MEDICINE

## 2023-04-12 PROCEDURE — 82728 ASSAY OF FERRITIN: CPT | Performed by: INTERNAL MEDICINE

## 2023-04-12 PROCEDURE — G0103 PSA SCREENING: HCPCS | Performed by: INTERNAL MEDICINE

## 2023-04-12 PROCEDURE — 80050 GENERAL HEALTH PANEL: CPT | Performed by: INTERNAL MEDICINE

## 2023-04-12 PROCEDURE — 80061 LIPID PANEL: CPT | Performed by: INTERNAL MEDICINE

## 2023-04-12 PROCEDURE — 99214 OFFICE O/P EST MOD 30 MIN: CPT | Mod: 25 | Performed by: INTERNAL MEDICINE

## 2023-04-12 PROCEDURE — 82607 VITAMIN B-12: CPT | Performed by: INTERNAL MEDICINE

## 2023-04-12 PROCEDURE — 36415 COLL VENOUS BLD VENIPUNCTURE: CPT | Performed by: INTERNAL MEDICINE

## 2023-04-12 PROCEDURE — 84403 ASSAY OF TOTAL TESTOSTERONE: CPT | Performed by: INTERNAL MEDICINE

## 2023-04-12 PROCEDURE — 85045 AUTOMATED RETICULOCYTE COUNT: CPT | Performed by: INTERNAL MEDICINE

## 2023-04-12 RX ORDER — CHOLECALCIFEROL (VITAMIN D3) 50 MCG
1 TABLET ORAL DAILY
COMMUNITY

## 2023-04-12 ASSESSMENT — PATIENT HEALTH QUESTIONNAIRE - PHQ9
SUM OF ALL RESPONSES TO PHQ QUESTIONS 1-9: 2
10. IF YOU CHECKED OFF ANY PROBLEMS, HOW DIFFICULT HAVE THESE PROBLEMS MADE IT FOR YOU TO DO YOUR WORK, TAKE CARE OF THINGS AT HOME, OR GET ALONG WITH OTHER PEOPLE: SOMEWHAT DIFFICULT
SUM OF ALL RESPONSES TO PHQ QUESTIONS 1-9: 2

## 2023-04-12 ASSESSMENT — ENCOUNTER SYMPTOMS
HEADACHES: 0
DIARRHEA: 0
MYALGIAS: 0
CONSTIPATION: 1
ARTHRALGIAS: 0
JOINT SWELLING: 0
HEARTBURN: 0
ABDOMINAL PAIN: 0
EYE PAIN: 0
PARESTHESIAS: 0
HEMATOCHEZIA: 0
DYSURIA: 0
SHORTNESS OF BREATH: 0
NAUSEA: 0
FREQUENCY: 0
COUGH: 0
WEAKNESS: 0
HEMATURIA: 0
DIZZINESS: 0
NERVOUS/ANXIOUS: 0
PALPITATIONS: 0
SORE THROAT: 1
CHILLS: 0
FEVER: 0

## 2023-04-12 ASSESSMENT — PAIN SCALES - GENERAL: PAINLEVEL: NO PAIN (0)

## 2023-04-12 NOTE — PROGRESS NOTES
Office Visit - Physical   Kalyan Hubbard   53 year old  male    Date of visit: 4/12/2023  Physician: Darrin Leon MD     Assessment and Plan   1. Annual physical exam  This is a 53-year-old man with issues as discussed below    2. Screening for prostate cancer  - Prostate Specific Antigen Screen; Future  - Prostate Specific Antigen Screen    3. Screening for diabetes mellitus  - Hemoglobin A1c; Future  - Hemoglobin A1c    4. Mixed hyperlipidemia  Calcium score 0  - Lipid panel reflex to direct LDL Fasting; Future  - Comprehensive metabolic panel; Future  - UA Macroscopic with reflex to Microscopic and Culture; Future  - Lipid panel reflex to direct LDL Fasting  - Comprehensive metabolic panel  - UA Macroscopic with reflex to Microscopic and Culture    5. Carotid artery calcification, bilateral  Based on x-rays at dental office  - US Carotid Bilateral; Future    6. Normocytic anemia  He does donate blood check lab  - Ferritin; Future  - Folate; Future  - Vitamin B12; Future  - Reticulocyte count; Future  - TSH with free T4 reflex; Future  - CBC with Platelets & Differential; Future  - Ferritin  - Folate  - Vitamin B12  - Reticulocyte count  - TSH with free T4 reflex  - CBC with Platelets & Differential    7. Erectile dysfunction, unspecified erectile dysfunction type  Continue with sildenafil  - Testosterone, total; Future  - Testosterone, total    8. Mild recurrent major depression (H)  Doing well    9. Skin lesion  - Adult Dermatology Referral; Future    10. Toenail fungus  Discussed treatment, he can also discuss with dermatology, discussed risk of hepatic injury with treatment and he would like to defer at this time    11. Sore throat  No abnormality noted on exam, trial of omeprazole if not improved after 1 month we will have him see ENT  - omeprazole (PRILOSEC) 20 MG DR capsule; Take 1 capsule (20 mg) by mouth daily  Dispense: 30 capsule; Refill: 1      Return in about 1 year (around 4/12/2024) for  Routine preventive.     Chief Complaint   Chief Complaint   Patient presents with     Physical     Fasting for labs         Patient Profile   Social History     Social History Narrative    Lives with his wife, Dorcas.  Works as a  for [employer].  Dorcas works as a faculty development office at [employer].        Past Medical History   Patient Active Problem List   Diagnosis     Mild recurrent major depression (H)     Mixed hyperlipidemia     Erectile dysfunction, unspecified erectile dysfunction type     Rosacea       Past Surgical History  He has a past surgical history that includes APPENDECTOMY (01/01/1995) and TOOTH EXTRACTION W/FORCEP (01/01/2001).     History of Present Illness   This 53 year old man comes in for annual physical.  Overall he is feeling well.  He has a few things he like to talk about.  He had some x-rays done at the dentist office and was told he has carotid artery calcification based on the x-ray.  He did have a CT coronary calcium scan done in 2020 which showed no calcium.  He had a little bit of anemia last visit and I had recommended some additional studies which were not done in 2021.  He does donate blood.  He has some skin lesions on his eye, skin tags, that he would like removed.  He has some toenail fungus and wondering about treatment.  He had a mild sore throat, no drainage sinus symptoms or reflux    Review of Systems: A comprehensive review of systems was negative except as noted.     Medications and Allergies   Current Outpatient Medications   Medication Sig Dispense Refill     omeprazole (PRILOSEC) 20 MG DR capsule Take 1 capsule (20 mg) by mouth daily 30 capsule 1     sildenafil (REVATIO) 20 MG tablet Take 1-3 tablets (20-60 mg) by mouth daily as needed (ED) 30 tablet 5     vitamin D3 (CHOLECALCIFEROL) 50 mcg (2000 units) tablet Take 1 tablet by mouth daily       Allergies   Allergen Reactions     Paxil [Paroxetine]      suicidal ideation  "(age 30), hallucinations        Family and Social History   Family History   Problem Relation Age of Onset     No Known Problems Mother      Diabetes Father         type II DM     C.A.D. Father         63yo ASCVD-stenting     No Known Problems Sister      No Known Problems Sister      No Known Problems Brother         Social History     Tobacco Use     Smoking status: Never     Smokeless tobacco: Never   Substance Use Topics     Alcohol use: No     Comment: very rare     Drug use: No        Physical Exam   General Appearance:   No acute distress    /74 (BP Location: Left arm, Patient Position: Sitting, Cuff Size: Adult Regular)   Pulse 57   Temp 97.9  F (36.6  C) (Tympanic)   Resp 18   Ht 1.816 m (5' 11.5\")   Wt 86.2 kg (190 lb)   SpO2 98%   BMI 26.13 kg/m      EYES: Eyelids, conjunctiva, and sclera were normal. Pupils were normal. Cornea, iris, and lens were normal bilaterally.  HEAD, EARS, NOSE, MOUTH, AND THROAT: Head and face were normal. Hearing was normal to voice and the ears were normal to external exam.  NECK: Neck appearance was normal. There were no neck masses and the thyroid was not enlarged.  RESPIRATORY: Breathing pattern was normal and the chest moved symmetrically.  Percussion/auscultatory percussion was normal.  Lung sounds were normal and there were no abnormal sounds.  CARDIOVASCULAR: Heart rate and rhythm were normal.  S1 and S2 were normal and there were no extra sounds or murmurs. Peripheral pulses in arms and legs were normal.  Jugular venous pressure was normal.  There was no peripheral edema.  GASTROINTESTINAL: The abdomen was normal in contour.  Bowel sounds were present.  Percussion detected no organ enlargement or tenderness.  Palpation detected no tenderness, mass, or enlarged organs.   MUSCULOSKELETAL: Skeletal configuration was normal and muscle mass was normal for age. Joint appearance was overall normal.  LYMPHATIC: There were no enlarged nodes.  SKIN/HAIR/NAILS: Skin " color was normal.  There were no skin lesions skin tags of I noted.  Onychomycosis of his toenails  NEUROLOGIC: The patient was alert and oriented to person, place, time, and circumstance. Speech was normal. Cranial nerves were normal. Motor strength was normal for age. The patient was normally coordinated.  PSYCHIATRIC:  Mood and affect were normal and the patient had normal recent and remote memory. The patient's judgment and insight were normal.       Additional Information        Darrin Leon MD  Internal Medicine  Contact me at [phone]

## 2023-04-14 LAB — TESTOST SERPL-MCNC: 291 NG/DL (ref 240–950)

## 2023-04-25 ENCOUNTER — MYC MEDICAL ADVICE (OUTPATIENT)
Dept: INTERNAL MEDICINE | Facility: CLINIC | Age: 53
End: 2023-04-25
Payer: COMMERCIAL

## 2023-04-25 ENCOUNTER — HOSPITAL ENCOUNTER (OUTPATIENT)
Dept: ULTRASOUND IMAGING | Facility: CLINIC | Age: 53
Discharge: HOME | End: 2023-04-25
Attending: INTERNAL MEDICINE | Admitting: INTERNAL MEDICINE
Payer: COMMERCIAL

## 2023-04-25 DIAGNOSIS — I65.23 BILATERAL CAROTID ARTERY STENOSIS: Primary | ICD-10-CM

## 2023-04-25 DIAGNOSIS — I65.23 CAROTID ARTERY CALCIFICATION, BILATERAL: ICD-10-CM

## 2023-04-25 PROCEDURE — 93880 EXTRACRANIAL BILAT STUDY: CPT

## 2023-04-26 RX ORDER — ROSUVASTATIN CALCIUM 20 MG/1
20 TABLET, COATED ORAL DAILY
Qty: 90 TABLET | Refills: 4 | Status: SHIPPED | OUTPATIENT
Start: 2023-04-26 | End: 2024-06-19

## 2023-06-13 ENCOUNTER — VIRTUAL VISIT (OUTPATIENT)
Dept: VASCULAR SURGERY | Facility: CLINIC | Age: 53
End: 2023-06-13
Attending: INTERNAL MEDICINE
Payer: COMMERCIAL

## 2023-06-13 DIAGNOSIS — I65.23 BILATERAL CAROTID ARTERY STENOSIS: ICD-10-CM

## 2023-06-13 PROCEDURE — 99204 OFFICE O/P NEW MOD 45 MIN: CPT | Mod: VID | Performed by: SURGERY

## 2023-06-13 NOTE — PROGRESS NOTES
[facility]        Patient is here for a consult to discuss carotid artery obstruction    Pt is currently taking Aspirin.    There were no vitals taken for this visit.    The provider has been notified that the patient has no concerns.     Questions patient would like addressed today are: N/A.    Refills are needed: [facility]    Has homecare services and agency name:  [facility]    Virtual Visit Details    Type of service:  Video Visit     Originating Location (pt. Location): Home  Distant Location (provider location):  On-site  Platform used for Video Visit: Mama   Video visit started at 2:50 PM and ended at 3:05 PM.  Mild to moderate carotid artery disease, most apparent on the right.  Patient is asymptomatic.  He denies any significant past medical history, denies any smoking.  Just started atorvastatin.  I encouraged him to take baby aspirin a day.  I do not foresee any intervention anytime soon.  Annual follow-up with carotid ultrasound.

## 2023-06-13 NOTE — NURSING NOTE
Chief Complaint   Patient presents with     Consult     Consult to discuss obstruction in is carotid arteries, referred by Dr. Leon. Medications/allergies reviewed with pt, no changes per pt.       Is the patient currently in the state of [place]? YES    Visit mode:VIDEO    If the visit is dropped, the patient can be reconnected by: VIDEO VISIT: Send to e-mail at: [email or web address]    Will anyone else be joining the visit? NO      How would you like to obtain your AVS? MyChart    Are changes needed to the allergy or medication list? NO    Patient denies any changes since echeck-in regarding medication and allergies and states all information entered during echeck-in remains accurate.    Wei Bee, Visit Facilitator/MA.

## 2023-10-21 ENCOUNTER — IMMUNIZATION (OUTPATIENT)
Dept: FAMILY MEDICINE | Facility: CLINIC | Age: 53
End: 2023-10-21
Payer: COMMERCIAL

## 2023-10-21 PROCEDURE — 90471 IMMUNIZATION ADMIN: CPT

## 2023-10-21 PROCEDURE — 90682 RIV4 VACC RECOMBINANT DNA IM: CPT

## 2023-11-16 ENCOUNTER — IMMUNIZATION (OUTPATIENT)
Dept: NURSING | Facility: CLINIC | Age: 53
End: 2023-11-16
Payer: COMMERCIAL

## 2023-11-16 PROCEDURE — 91320 SARSCV2 VAC 30MCG TRS-SUC IM: CPT

## 2023-11-16 PROCEDURE — 90480 ADMN SARSCOV2 VAC 1/ONLY CMP: CPT

## 2024-03-12 ENCOUNTER — TELEPHONE (OUTPATIENT)
Dept: VASCULAR SURGERY | Facility: CLINIC | Age: 54
End: 2024-03-12
Payer: COMMERCIAL

## 2024-03-12 NOTE — TELEPHONE ENCOUNTER
LMTCB to schedule US + 1yr carotid follow up with Dr. Sol in June (new to , need 60min). [phone]    ____________________________________  Dayana Espinosa RN  [facility]  Schedule follow up in one year with vascular medicine virtual or in person

## 2024-03-13 ENCOUNTER — PATIENT OUTREACH (OUTPATIENT)
Dept: CARE COORDINATION | Facility: CLINIC | Age: 54
End: 2024-03-13
Payer: COMMERCIAL

## 2024-03-21 NOTE — TELEPHONE ENCOUNTER
LMTCB #3 to schedule US + 1yr carotid follow up with Dr. Sol in June (new to , need 60min). [phone]

## 2024-03-27 ENCOUNTER — PATIENT OUTREACH (OUTPATIENT)
Dept: CARE COORDINATION | Facility: CLINIC | Age: 54
End: 2024-03-27
Payer: COMMERCIAL

## 2024-05-09 ENCOUNTER — OFFICE VISIT (OUTPATIENT)
Dept: INTERNAL MEDICINE | Facility: CLINIC | Age: 54
End: 2024-05-09
Payer: COMMERCIAL

## 2024-05-09 VITALS
RESPIRATION RATE: 16 BRPM | HEIGHT: 71 IN | DIASTOLIC BLOOD PRESSURE: 62 MMHG | HEART RATE: 68 BPM | BODY MASS INDEX: 27.16 KG/M2 | SYSTOLIC BLOOD PRESSURE: 118 MMHG | OXYGEN SATURATION: 98 % | TEMPERATURE: 97.9 F | WEIGHT: 194 LBS

## 2024-05-09 DIAGNOSIS — Z12.11 SCREENING FOR COLON CANCER: ICD-10-CM

## 2024-05-09 DIAGNOSIS — F33.0 MAJOR DEPRESSIVE DISORDER, RECURRENT EPISODE, MILD (H): ICD-10-CM

## 2024-05-09 DIAGNOSIS — E78.5 HYPERLIPIDEMIA LDL GOAL <100: ICD-10-CM

## 2024-05-09 DIAGNOSIS — R06.09 DYSPNEA ON EXERTION: ICD-10-CM

## 2024-05-09 DIAGNOSIS — K59.09 CHRONIC CONSTIPATION: ICD-10-CM

## 2024-05-09 DIAGNOSIS — N52.9 ERECTILE DYSFUNCTION, UNSPECIFIED ERECTILE DYSFUNCTION TYPE: ICD-10-CM

## 2024-05-09 DIAGNOSIS — B35.1 ONYCHOMYCOSIS: ICD-10-CM

## 2024-05-09 DIAGNOSIS — E03.9 ACQUIRED HYPOTHYROIDISM: ICD-10-CM

## 2024-05-09 DIAGNOSIS — F98.8 ATTENTION DEFICIT DISORDER WITHOUT HYPERACTIVITY: ICD-10-CM

## 2024-05-09 DIAGNOSIS — I65.23 CAROTID ARTERY STENOSIS, ASYMPTOMATIC, BILATERAL: Primary | ICD-10-CM

## 2024-05-09 PROBLEM — Z12.5 SCREENING FOR PROSTATE CANCER: Status: ACTIVE | Noted: 2024-05-09

## 2024-05-09 PROBLEM — E78.2 MIXED HYPERLIPIDEMIA: Status: RESOLVED | Noted: 2020-02-24 | Resolved: 2024-05-09

## 2024-05-09 LAB
ALBUMIN SERPL BCG-MCNC: 4.8 G/DL (ref 3.5–5.2)
ALP SERPL-CCNC: 65 U/L (ref 40–150)
ALT SERPL W P-5'-P-CCNC: 44 U/L (ref 0–70)
ANION GAP SERPL CALCULATED.3IONS-SCNC: 11 MMOL/L (ref 7–15)
AST SERPL W P-5'-P-CCNC: 34 U/L (ref 0–45)
BILIRUB SERPL-MCNC: 0.4 MG/DL
BUN SERPL-MCNC: 17.4 MG/DL (ref 6–20)
CALCIUM SERPL-MCNC: 9.8 MG/DL (ref 8.6–10)
CHLORIDE SERPL-SCNC: 104 MMOL/L (ref 98–107)
CHOLEST SERPL-MCNC: 121 MG/DL
CREAT SERPL-MCNC: 0.94 MG/DL (ref 0.67–1.17)
DEPRECATED HCO3 PLAS-SCNC: 26 MMOL/L (ref 22–29)
EGFRCR SERPLBLD CKD-EPI 2021: >90 ML/MIN/1.73M2
ERYTHROCYTE [DISTWIDTH] IN BLOOD BY AUTOMATED COUNT: 13.6 % (ref 10–15)
FASTING STATUS PATIENT QL REPORTED: YES
FASTING STATUS PATIENT QL REPORTED: YES
GLUCOSE SERPL-MCNC: 89 MG/DL (ref 70–99)
HCT VFR BLD AUTO: 45.1 % (ref 40–53)
HDLC SERPL-MCNC: 52 MG/DL
HGB BLD-MCNC: 14.1 G/DL (ref 13.3–17.7)
LDLC SERPL CALC-MCNC: 52 MG/DL
MCH RBC QN AUTO: 26.9 PG (ref 26.5–33)
MCHC RBC AUTO-ENTMCNC: 31.3 G/DL (ref 31.5–36.5)
MCV RBC AUTO: 86 FL (ref 78–100)
NONHDLC SERPL-MCNC: 69 MG/DL
PLATELET # BLD AUTO: 201 10E3/UL (ref 150–450)
POTASSIUM SERPL-SCNC: 4.8 MMOL/L (ref 3.4–5.3)
PROT SERPL-MCNC: 7.3 G/DL (ref 6.4–8.3)
PSA SERPL DL<=0.01 NG/ML-MCNC: 0.51 NG/ML (ref 0–3.5)
RBC # BLD AUTO: 5.25 10E6/UL (ref 4.4–5.9)
SODIUM SERPL-SCNC: 141 MMOL/L (ref 135–145)
TRIGL SERPL-MCNC: 85 MG/DL
TSH SERPL DL<=0.005 MIU/L-ACNC: 2.75 UIU/ML (ref 0.3–4.2)
WBC # BLD AUTO: 6.4 10E3/UL (ref 4–11)

## 2024-05-09 PROCEDURE — 85027 COMPLETE CBC AUTOMATED: CPT | Performed by: INTERNAL MEDICINE

## 2024-05-09 PROCEDURE — 80053 COMPREHEN METABOLIC PANEL: CPT | Performed by: INTERNAL MEDICINE

## 2024-05-09 PROCEDURE — 80061 LIPID PANEL: CPT | Performed by: INTERNAL MEDICINE

## 2024-05-09 PROCEDURE — 36415 COLL VENOUS BLD VENIPUNCTURE: CPT | Performed by: INTERNAL MEDICINE

## 2024-05-09 PROCEDURE — 93005 ELECTROCARDIOGRAM TRACING: CPT | Performed by: INTERNAL MEDICINE

## 2024-05-09 PROCEDURE — 93010 ELECTROCARDIOGRAM REPORT: CPT | Performed by: INTERNAL MEDICINE

## 2024-05-09 PROCEDURE — G0103 PSA SCREENING: HCPCS | Performed by: INTERNAL MEDICINE

## 2024-05-09 PROCEDURE — 84443 ASSAY THYROID STIM HORMONE: CPT | Performed by: INTERNAL MEDICINE

## 2024-05-09 PROCEDURE — 99214 OFFICE O/P EST MOD 30 MIN: CPT | Mod: 25 | Performed by: INTERNAL MEDICINE

## 2024-05-09 PROCEDURE — 99396 PREV VISIT EST AGE 40-64: CPT | Performed by: INTERNAL MEDICINE

## 2024-05-09 RX ORDER — TERBINAFINE HYDROCHLORIDE 250 MG/1
250 TABLET ORAL DAILY
Qty: 90 TABLET | Refills: 0 | Status: SHIPPED | OUTPATIENT
Start: 2024-05-09 | End: 2024-08-07

## 2024-05-09 SDOH — HEALTH STABILITY: PHYSICAL HEALTH: ON AVERAGE, HOW MANY DAYS PER WEEK DO YOU ENGAGE IN MODERATE TO STRENUOUS EXERCISE (LIKE A BRISK WALK)?: 4 DAYS

## 2024-05-09 ASSESSMENT — PATIENT HEALTH QUESTIONNAIRE - PHQ9
10. IF YOU CHECKED OFF ANY PROBLEMS, HOW DIFFICULT HAVE THESE PROBLEMS MADE IT FOR YOU TO DO YOUR WORK, TAKE CARE OF THINGS AT HOME, OR GET ALONG WITH OTHER PEOPLE: SOMEWHAT DIFFICULT
SUM OF ALL RESPONSES TO PHQ QUESTIONS 1-9: 3
SUM OF ALL RESPONSES TO PHQ QUESTIONS 1-9: 3

## 2024-05-09 ASSESSMENT — SOCIAL DETERMINANTS OF HEALTH (SDOH): HOW OFTEN DO YOU GET TOGETHER WITH FRIENDS OR RELATIVES?: ONCE A WEEK

## 2024-05-09 NOTE — LETTER
May 10, 2024      Kalyan Hubbard  [address] [facility] [id number]        Dear ,    We are writing to inform you of your test results.    Your tests are all good.  The cholesterols are good, the blood chemistries, the thyroid function, the prostate cancer test and the blood counts are all good.  The MCHC test minor abnormality is not significant.     Resulted Orders   Comprehensive metabolic panel   Result Value Ref Range    Sodium 141 135 - 145 mmol/L      Comment:      Reference intervals for this test were updated on 09/26/2023 to more accurately reflect our healthy population. There may be differences in the flagging of prior results with similar values performed with this method. Interpretation of those prior results can be made in the context of the updated reference intervals.     Potassium 4.8 3.4 - 5.3 mmol/L    Carbon Dioxide (CO2) 26 22 - 29 mmol/L    Anion Gap 11 7 - 15 mmol/L    Urea Nitrogen 17.4 6.0 - 20.0 mg/dL    Creatinine 0.94 0.67 - 1.17 mg/dL    GFR Estimate >90 >60 mL/min/1.73m2    Calcium 9.8 8.6 - 10.0 mg/dL    Chloride 104 98 - 107 mmol/L    Glucose 89 70 - 99 mg/dL    Alkaline Phosphatase 65 40 - 150 U/L      Comment:      Reference intervals for this test were updated on 11/14/2023 to more accurately reflect our healthy population. There may be differences in the flagging of prior results with similar values performed with this method. Interpretation of those prior results can be made in the context of the updated reference intervals.    AST 34 0 - 45 U/L      Comment:      Reference intervals for this test were updated on 6/12/2023 to more accurately reflect our healthy population. There may be differences in the flagging of prior results with similar values performed with this method. Interpretation of those prior results can be made in the context of the updated reference intervals.    ALT 44 0 - 70 U/L      Comment:      Reference intervals for this test were updated  on 6/12/2023 to more accurately reflect our healthy population. There may be differences in the flagging of prior results with similar values performed with this method. Interpretation of those prior results can be made in the context of the updated reference intervals.      Protein Total 7.3 6.4 - 8.3 g/dL    Albumin 4.8 3.5 - 5.2 g/dL    Bilirubin Total 0.4 <=1.2 mg/dL    Patient Fasting > 8hrs? Yes    CBC with platelets   Result Value Ref Range    WBC Count 6.4 4.0 - 11.0 10e3/uL    RBC Count 5.25 4.40 - 5.90 10e6/uL    Hemoglobin 14.1 13.3 - 17.7 g/dL    Hematocrit 45.1 40.0 - 53.0 %    MCV 86 78 - 100 fL    MCH 26.9 26.5 - 33.0 pg    MCHC 31.3 (L) 31.5 - 36.5 g/dL    RDW 13.6 10.0 - 15.0 %    Platelet Count 201 150 - 450 10e3/uL   Lipid panel reflex to direct LDL Fasting   Result Value Ref Range    Cholesterol 121 <200 mg/dL    Triglycerides 85 <150 mg/dL    Direct Measure HDL 52 >=40 mg/dL    LDL Cholesterol Calculated 52 <=100 mg/dL    Non HDL Cholesterol 69 <130 mg/dL    Patient Fasting > 8hrs? Yes     Narrative    Cholesterol  Desirable:  <200 mg/dL    Triglycerides  Normal:  Less than 150 mg/dL  Borderline High:  150-199 mg/dL  High:  200-499 mg/dL  Very High:  Greater than or equal to 500 mg/dL    Direct Measure HDL  Female:  Greater than or equal to 50 mg/dL   Male:  Greater than or equal to 40 mg/dL    LDL Cholesterol  Desirable:  <100mg/dL  Above Desirable:  100-129 mg/dL   Borderline High:  130-159 mg/dL   High:  160-189 mg/dL   Very High:  >= 190 mg/dL    Non HDL Cholesterol  Desirable:  130 mg/dL  Above Desirable:  130-159 mg/dL  Borderline High:  160-189 mg/dL  High:  190-219 mg/dL  Very High:  Greater than or equal to 220 mg/dL   TSH with free T4 reflex   Result Value Ref Range    TSH 2.75 0.30 - 4.20 uIU/mL   PSA, screen   Result Value Ref Range    Prostate Specific Antigen Screen 0.51 0.00 - 3.50 ng/mL    Narrative    This result is obtained using the Roche Elecsys total PSA method on the isaias  e801 immunoassay analyzer. Results obtained with different assay methods or kits cannot be used interchangeably.       If you have any questions or concerns, please call the clinic at the number listed above.       Sincerely,      Compa Boyce MD

## 2024-05-09 NOTE — PROGRESS NOTES
"Preventive Care Visit  [facility]  Copma Boyce MD, Internal Medicine  May 9, 2024    Assessment & Plan     Carotid artery stenosis, asymptomatic, bilateral  Is on statin and low dose aspirin.  Cardiac calcium scan was negative.     Major depressive disorder, recurrent episode, mild   Resolved. Family history in mother.  No apparent recurrence.     Hyperlipidemia LDL goal <100  Ongoing statin therapy. Will assess his lipid profile    Screening for colon cancer  Colonoscopy 2/2022, multiple polyps, due in 2/2025.     Onychomycosis  We discussed risks and benefits of 12 weeks of terbinafine.  He elects to have treatment - will get LFT's in 4-6 weeks to be sure there is no hepatic effects.     Decreased exertional tolerance  A decrease in aerobic capacity with exercise, without chest pain.  He wants to continue aggressive exercise. Will get TFT's and echostress test to r/o possible significant CAD    Screening for prostate cancer  PSA today    BMI  Estimated body mass index is 27.06 kg/m  as calculated from the following:    Height as of this encounter: 1.803 m (5' 11\").    Weight as of this encounter: 88 kg (194 lb).     Counseling  Appropriate preventive services were discussed with this patient.    Follow up one year and as needed.     Lisa Biggs is a 54 year old, presenting for the following:  Physical (Fasting ) and Toenail (Concerns about toe nail fungus - possible referral to see derm/podiatry )        5/9/2024     8:15 AM   Additional Questions   Roomed by EMORY Bianchi   Accompanied by alone         5/9/2024     8:15 AM   Patient Reported Additional Medications   Patient reports taking the following new medications none     Health Care Directive  Discussed advance care planning with patient; however, patient declined at this time.    HPI: he has onycomycosis and wishes to have it treated as he runs and they bother. He has been well overall, but has noted decrease in aerobic power. "  He doesn't have palpitations or chest pain.  What he most notices is that he needs more than a day or two to recover from an exercise day.  Sleeping well, no cough or wheezing, no bowel or bladder issues.  Doesn't smoke or use alcohol.         5/9/2024   General Health   How would you rate your overall physical health? Good   Feel stress (tense, anxious, or unable to sleep) Not at all         5/9/2024   Nutrition   Three or more servings of calcium each day? Yes   Diet: Regular (no restrictions)   How many servings of fruit and vegetables per day? 4 or more   How many sweetened beverages each day? 0-1         5/9/2024   Exercise   Days per week of moderate/strenous exercise 4 days         5/9/2024   Social Factors   Frequency of gathering with friends or relatives Once a week   Worry food won't last until get money to buy more No   Food not last or not have enough money for food? No   Do you have housing?  Yes   Are you worried about losing your housing? No   Lack of transportation? No   Unable to get utilities (heat,electricity)? No         5/9/2024   Fall Risk   Fallen 2 or more times in the past year? No   Trouble with walking or balance? No          5/9/2024   Dental   Dentist two times every year? Yes         5/9/2024   TB Screening   Were you born outside of the [place]? No     Today's PHQ-9 Score:       5/9/2024     8:07 AM   PHQ-9 SCORE   PHQ-9 Total Score MyChart 3 (Minimal depression)   PHQ-9 Total Score 3         5/9/2024   Substance Use   Alcohol more than 3/day or more than 7/wk Not Applicable   Do you use any other substances recreationally? No     Social History     Tobacco Use    Smoking status: Never    Smokeless tobacco: Never   Substance Use Topics    Alcohol use: No     Comment: very rare    Drug use: No         5/9/2024   STI Screening   New sexual partner(s) since last STI/HIV test? No   Last PSA:   PSA   Date Value Ref Range Status   11/22/2019 0.47 0 - 4 ug/L Final     Comment:     Assay Method:   "Chemiluminescence using Siemens Vista analyzer     Prostate Specific Antigen Screen   Date Value Ref Range Status   04/12/2023 0.47 0.00 - 3.50 ng/mL Final   11/30/2021 0.53 0.00 - 3.50 ug/L Final     ASCVD Risk   The 10-year ASCVD risk score (Gissel JACKSON, et al., 2019) is: 5.8%    Values used to calculate the score:      Age: 54 years      Sex: Male      Is Non- : No      Diabetic: No      Tobacco smoker: No      Systolic Blood Pressure: 118 mmHg      Is BP treated: No      HDL Cholesterol: 49 mg/dL      Total Cholesterol: 229 mg/dL  Reviewed and updated as needed this visit by Provider    Past Medical History:   Diagnosis Date    Carotid artery stenosis, asymptomatic, bilateral     History of depression     Hyperlipidemia LDL goal <100      Past Surgical History:   Procedure Laterality Date    APPENDECTOMY      WISDOM TOOTH EXTRACTION       Review of Systems  See HPI    Objective    PHYSICAL EXAM:  General Appearance: In no acute distress  Vitals:    05/09/24 0816   BP: 118/62   BP Location: Left arm   Patient Position: Sitting   Cuff Size: Adult Regular   Pulse: 68   Resp: 16   Temp: 97.9  F (36.6  C)   TempSrc: Tympanic   SpO2: 98%   Weight: 88 kg (194 lb)   Height: 1.803 m (5' 11\")     Estimated body mass index is 27.06 kg/m  as calculated from the following:    Height as of this encounter: 1.803 m (5' 11\").    Weight as of this encounter: 88 kg (194 lb).  EYES: Clear, fundi are unremarkable   HEENT: nose and throat clear, ears normal   NECK:  without cervical or axillary adenopathy  RESPIRATORY: Clear   CARDIOVASCULAR: S1, S2  ABDOMEN: soft, flat, and non-tender  EXTREMITIES:  no significant inflammation or edema  NEUROLOGIC: Speech is clear, gait is normal  PSYCHIATRIC: Oriented X 3, thinking is clear     Signed Electronically by: Compa Boyce MD    "

## 2024-05-10 LAB
ATRIAL RATE - MUSE: 56 BPM
DIASTOLIC BLOOD PRESSURE - MUSE: NORMAL MMHG
INTERPRETATION ECG - MUSE: NORMAL
P AXIS - MUSE: 20 DEGREES
PR INTERVAL - MUSE: 166 MS
QRS DURATION - MUSE: 92 MS
QT - MUSE: 422 MS
QTC - MUSE: 407 MS
R AXIS - MUSE: 29 DEGREES
SYSTOLIC BLOOD PRESSURE - MUSE: NORMAL MMHG
T AXIS - MUSE: 16 DEGREES
VENTRICULAR RATE- MUSE: 56 BPM

## 2024-06-11 DIAGNOSIS — R06.09 DYSPNEA ON EXERTION: Primary | ICD-10-CM

## 2024-06-18 DIAGNOSIS — I65.23 BILATERAL CAROTID ARTERY STENOSIS: ICD-10-CM

## 2024-06-18 DIAGNOSIS — N52.9 ERECTILE DYSFUNCTION, UNSPECIFIED ERECTILE DYSFUNCTION TYPE: ICD-10-CM

## 2024-06-19 RX ORDER — SILDENAFIL CITRATE 20 MG/1
TABLET ORAL
Qty: 30 TABLET | Refills: 0 | Status: SHIPPED | OUTPATIENT
Start: 2024-06-19

## 2024-06-19 RX ORDER — ROSUVASTATIN CALCIUM 20 MG/1
20 TABLET, COATED ORAL DAILY
Qty: 90 TABLET | Refills: 2 | Status: SHIPPED | OUTPATIENT
Start: 2024-06-19

## 2024-06-27 NOTE — PROGRESS NOTES
"[facility]        Patient is here for a 1 year follow up  to discuss Carotid stenosis. Patient has no symptoms.    Pt is currently taking Aspirin and Statin.    /78   Pulse 60   Resp 18   Ht 5' 11\" (1.803 m)   Wt 194 lb (88 kg)   BMI 27.06 kg/m      The provider has been notified that the patient has no concerns.     Questions patient would like addressed today are: N/A.    Refills are needed: N/A    Has homecare services and agency name:  No           "

## 2024-06-27 NOTE — PROGRESS NOTES
Carotid Disease Education Note    The following information has been reviewed with the patient:    Warning signs of stroke  Calling 911 if having warning signs of stroke  Patient's risk factors for stroke:    high cholesterol  Medications: Statin  Carotid surgery instructions: N/A      Educational Barriers: No barriers  Learner's response to risk factors / lifestyle modification education: Taking steps

## 2024-06-27 NOTE — PATIENT INSTRUCTIONS
"Lupe Biggs,    Thank you for entrusting your care with us today. After your visit today with MD Christiano Sol this is the plan that was discussed at your appointment.    Continue taking rosuvastatin.      You can continue the baby aspirin, but ok to stop taking if you want.     Follow up in 1 year with Dr. Sol and an ultrasound of your carotid arteries prior.  A  will reach out to you closer to that time to schedule.         I am including additional information on these things and our contact information if you have any questions or concerns.   Please do not hesitate to reach out to us if you felt we did not answer your questions or you are unsure of the treatment plan after your visit today. Our number is [phone].Thank you for trusting us with your care.         Again thank you for your time.       Carotid Artery Disease      What is carotid artery disease?  A carotid artery on each side of the neck supplies blood to the brain. Carotid artery disease occurs when a substance called plaque builds up in either or both arteries. The buildup may narrow the artery and limit blood flow to the brain. If this plaque breaks open, it may form a blood clot. Or pieces of the plaque may break off. A piece of plaque or a blood clot could move to the brain and cause a stroke or transient ischemic attack (TIA).    The narrowing in an artery is called stenosis. The more narrow an artery becomes, the greater the risk of stroke or TIA.        What causes it?  Carotid artery disease is caused by a process called hardening of the arteries, or atherosclerosis. Plaque builds up inside the carotid arteries. Things that can lead to this buildup include:    Smoking.  High blood pressure.  High cholesterol.  Diabetes.  A family history of hardening of the arteries.    What are the symptoms?  Many people have no symptoms. In some people, a doctor can hear a sound in their neck called a bruit (pronounced \"broo-EE\"). This is a " whooshing sound that happens when a carotid artery is narrowed.    For some people, a transient ischemic attack (TIA) or stroke is the first sign of the disease.    If you have any of these symptoms of a TIA or stroke, call 911 or other emergency services right away.    Sudden numbness, tingling, weakness, or loss of movement in your face, arm, or leg, especially on only one side of your body.  Sudden vision changes.  Sudden trouble speaking.  Sudden confusion or trouble understanding simple statements.  Sudden problems with walking or balance.  A sudden, severe headache that is different from past headaches.    How is it diagnosed?  An ultrasound test is used to diagnose carotid artery disease. This test uses sound waves to show how blood flows through your carotid arteries. You may have other tests such as a CT angiogram or a magnetic resonance angiogram (MRA) to check your carotid arteries.    Screening tests for carotid artery disease are not recommended for people who do not have signs or symptoms of carotid artery disease.footnote1, footnote2 If you have risk factors, signs, or symptoms of carotid artery disease, your doctor may recommend an ultrasound test to check for it.footnote2, footnote3    Some companies sell ultrasound screening. But insurance doesn't pay for these tests because experts don't recommend them. And since your doctor didn't prescribe the tests, they aren't there to explain the results to you. It's a good idea to talk to your doctor before having one of these tests.    How is carotid artery disease treated?  The goal of treatment is to lower your risk of a stroke. Treatment depends on whether you have symptoms and how narrow your arteries are. You probably will take medicine. You also will be encouraged to have a heart-healthy lifestyle. Some people also have a procedure to lower their risk.    Medicines  You may take aspirin or another medicine to prevent blood clots. You will likely also  take medicine to lower cholesterol. You may also take medicine to help manage blood pressure.    Heart-healthy lifestyle  A heart-healthy lifestyle can help lower your risk of stroke.    Don't smoke. Avoid secondhand smoke too.  Eat heart-healthy foods.  Be active. Ask your doctor what type of exercise is safe for you.  Stay at a healthy weight. Lose weight if you need to.  Manage other health problems, such as diabetes. If you think you may have a problem with alcohol or drug use, talk to your doctor.  Get vaccinated against COVID-19, the flu, and pneumonia.    Regular ultrasounds  Your doctor may recommend regular ultrasounds. This is to see if the narrowing in your arteries is getting worse.    Surgery or stenting  Surgery in the arteries is called carotid endarterectomy. The doctor makes a cut in the neck and takes the plaque out of the artery.    Some people have a stent placed inside a carotid artery. A stent may be inserted during a catheter procedure. In this procedure, a doctor puts a thin tube, called a catheter, into a blood vessel in your groin or arm. The doctor threads the tube up to the carotid artery in the neck. The catheter is used to place the stent. In another type of procedure, a stent is placed in the artery through a cut in the neck.    Surgery and stenting may help lower your risk of a stroke. But they also have a risk of serious problems. You and your doctor can decide together if you want to have surgery or stenting.    Current as of: June 24, 2023  Author: [employer] StaffYou are leaving this website for information purposes only  Clinical Review BoardYou are leaving this website for information purposes only  All [employer] education is reviewed by a team that includes physicians, nurses, advanced practitioners, registered dieticians, and other healthcare professionals.      Please scan the QR codes to watch videos about Stroke symptoms and Risk factors for stroke. Links are provided if  you are unable to use the code.          You can read more about the risk factors you can work on to prevent a stroke. Please scan the QR codes or links.

## 2024-07-03 ENCOUNTER — ANCILLARY PROCEDURE (OUTPATIENT)
Dept: VASCULAR ULTRASOUND | Facility: CLINIC | Age: 54
End: 2024-07-03
Attending: SURGERY
Payer: COMMERCIAL

## 2024-07-03 ENCOUNTER — OFFICE VISIT (OUTPATIENT)
Dept: VASCULAR SURGERY | Facility: CLINIC | Age: 54
End: 2024-07-03
Attending: SURGERY
Payer: COMMERCIAL

## 2024-07-03 VITALS
BODY MASS INDEX: 27.16 KG/M2 | SYSTOLIC BLOOD PRESSURE: 128 MMHG | HEART RATE: 60 BPM | HEIGHT: 71 IN | RESPIRATION RATE: 18 BRPM | DIASTOLIC BLOOD PRESSURE: 78 MMHG | WEIGHT: 194 LBS

## 2024-07-03 DIAGNOSIS — E78.5 DYSLIPIDEMIA: ICD-10-CM

## 2024-07-03 DIAGNOSIS — I65.23 BILATERAL CAROTID ARTERY STENOSIS: ICD-10-CM

## 2024-07-03 DIAGNOSIS — I65.22 STENOSIS OF LEFT CAROTID ARTERY: Primary | ICD-10-CM

## 2024-07-03 PROCEDURE — 99213 OFFICE O/P EST LOW 20 MIN: CPT | Mod: 25 | Performed by: HOSPITALIST

## 2024-07-03 PROCEDURE — 93880 EXTRACRANIAL BILAT STUDY: CPT | Mod: 26 | Performed by: SURGERY

## 2024-07-03 PROCEDURE — 99213 OFFICE O/P EST LOW 20 MIN: CPT | Performed by: HOSPITALIST

## 2024-07-03 PROCEDURE — 93880 EXTRACRANIAL BILAT STUDY: CPT

## 2024-07-03 ASSESSMENT — PAIN SCALES - GENERAL: PAINLEVEL: NO PAIN (0)

## 2024-07-03 NOTE — PROGRESS NOTES
"VASCULAR MEDICINE CONSULT NOTE          LOCATION:  [facility]       Date of Service: 7/3/2024      Primary Care Provider: Darrin Leon  Referring provider;  Ulises Wilhelm*      Reason for the visit/chief complaint:   Surveillance on carotid artery disease      HPI:  Kalyan Hubbard is a pleasant 54 year old male who presents to our Vascular Medicine clinic for the above mentioned reason.    Ms. Hubbard has past medical history significant for dyslipidemia and concern for left ICA stenosis.    He was initially noted to have \" bilateral carotid calcification\" as he is telling me through his dentist after a dental x-ray was performed last year.  He was then referred for ultrasound carotid that he completed 4/25/2023 revealing some increased velocities within the left  over 52 cm/s with normal ICA/CCA ratio 1.44.  Based on the concern for moderate 50-69% stenosis on the left, he was referred to vascular surgery.  He did a virtual visit with Dr. Franco last year, started aspirin 81 mg, statin and was continued on yearly surveillance.    He comes today for his annual surveillance.  Denies stroke, TIA or amaurosis fugax.    Denies MI or intermittent claudication.    He has history of dyslipidemia with  April of last year 2023.  After starting rosuvastatin 20 mg, his LDL is now 52 based on lipid panel 2 months ago.  Other than that, he has no smoking history, no hypertension or diabetes.  Reports family history of CAD with multiple stenting in his dad with the first event likely in his mid 60s.  His dad was a smoker.  Currently alive at age 80.        REVIEW OF SYSTEMS:    A 12 point ROS was reviewed and is negative except what is mentioned in HPI.       Past medical history, surgical history, medications, family history, social history and allergies were reviewed. Pertinent points mentioned under HPI.    OBJECTIVE:    Vital signs:  /78   Pulse 60   " "Resp 18   Ht 5' 11\" (1.803 m)   Wt 194 lb (88 kg)   BMI 27.06 kg/m    Wt Readings from Last 1 Encounters:   07/03/24 194 lb (88 kg)     Body mass index is 27.06 kg/m .    Physical exam:  General appearance: Pleasant male in no apparent distress.    HEENT: NC/AT.    Neck: Carotids +2/2 bilaterally without bruits.  No jugular venous distension.   Heart: RRR. Normal S1, S2. No murmur, rub, click, or gallop.   Chest: Clear to auscultation bilaterally.  Extremities: No swelling.  Easily palpable DP and PT 2/2 bilaterally.  Skin: No wounds.  Neurological: Alert, awake and oriented         DIAGNOSTIC STUDIES:   Labs and diagnostics reviewed including outside records. Pertinent points are mentioned under HPI and assessment and plan sections.        ASSESSMENT AND PLAN:    Concern for left ICA stenosis  Dyslipidemia: Controlled    Mr. Hubbard had what reported to be calcification noted on dental x-ray and bilateral carotid arteries that prompted screening for carotid artery stenosis.  Based on last year ultrasound, his systolic velocities on the left were slightly increased in the  cm/s as well as diastolic 52 cm/s however, no significant plaque noted with normal ICA/CCA ratio.  Today's ultrasound however, shows no plaques bilaterally with normal systolic and diastolic velocities as well as ICA/CCA ratio bilaterally.  I do that he has significant ICA stenosis.  Overall his only risk factors currently are gender and dyslipidemia that is now under good control.    Based on that and the fact that it is unclear if he truly has significant atherosclerosis, I told him that my recommendation for continuing aspirin 81 mg is soft but I advised him to continue statin given baseline dyslipidemia at least moderate intensity based on his 10-year ASCVD risk.    Will plan on getting another ultrasound in a year to further follow-up.  If next year ultrasound continues to show no evidence of stenosis bilaterally, then we could " stop surveillance.      Recommendations:  Ultrasound carotid in 1 year with follow-up.  If next year ultrasound is without any stenosis, could consider stopping surveillance.  Soft indication for aspirin 81 mg but could continue until next year ultrasound if no side effects.  Continue statin.  Blood pressure is well-controlled without any medications.  Discussed cardiovascular risk factor control through diet and exercise.  Encouraged to continue his current exercise routine.    It was a pleasure meeting Dax Haydee in our clinic today.      Christiano Sol MD  Vascular Medicine  July 3, 2024

## 2024-07-15 ENCOUNTER — LAB (OUTPATIENT)
Dept: LAB | Facility: CLINIC | Age: 54
End: 2024-07-15
Payer: COMMERCIAL

## 2024-07-15 DIAGNOSIS — B35.1 ONYCHOMYCOSIS: ICD-10-CM

## 2024-07-15 LAB
ALBUMIN SERPL BCG-MCNC: 4.4 G/DL (ref 3.5–5.2)
ALP SERPL-CCNC: 60 U/L (ref 40–150)
ALT SERPL W P-5'-P-CCNC: 24 U/L (ref 0–70)
AST SERPL W P-5'-P-CCNC: 22 U/L (ref 0–45)
BILIRUB DIRECT SERPL-MCNC: <0.2 MG/DL (ref 0–0.3)
BILIRUB SERPL-MCNC: 0.3 MG/DL
PROT SERPL-MCNC: 6.7 G/DL (ref 6.4–8.3)

## 2024-07-15 PROCEDURE — 36415 COLL VENOUS BLD VENIPUNCTURE: CPT

## 2024-07-15 PROCEDURE — 80076 HEPATIC FUNCTION PANEL: CPT

## 2024-12-11 NOTE — NURSING NOTE
Chronic, not at goal (unstable), refill of kenalog given.    Orders:    triamcinolone (ARISTOCORT) 0.5 % cream; Apply topically 3 times daily.     "Oncology Rooming Note    February 24, 2020 4:05 PM   Kalyan Hubbard is a 49 year old male who presents for:    Chief Complaint   Patient presents with     Oncology Clinic Visit     New; Lung Nodule     Initial Vitals: BP (!) 129/92   Pulse 66   Temp 97.8  F (36.6  C) (Oral)   Resp 14   Ht 1.829 m (6' 0.01\")   Wt 86.1 kg (189 lb 14.4 oz)   SpO2 97%   BMI 25.75 kg/m   Estimated body mass index is 25.75 kg/m  as calculated from the following:    Height as of this encounter: 1.829 m (6' 0.01\").    Weight as of this encounter: 86.1 kg (189 lb 14.4 oz). Body surface area is 2.09 meters squared.  No Pain (0) Comment: Data Unavailable   No LMP for male patient.  Allergies reviewed: Yes  Medications reviewed: Yes    Medications: Medication refills not needed today.  Pharmacy name entered into [employer]: [facility] - [place], [place] - [address] [place] [address]    Clinical concerns: No concerns        Jaclyn Gray CMA              "

## 2024-12-26 ENCOUNTER — IMMUNIZATION (OUTPATIENT)
Dept: FAMILY MEDICINE | Facility: CLINIC | Age: 54
End: 2024-12-26
Payer: COMMERCIAL

## 2024-12-26 VITALS — TEMPERATURE: 98.2 F

## 2025-03-04 ENCOUNTER — TRANSFERRED RECORDS (OUTPATIENT)
Dept: HEALTH INFORMATION MANAGEMENT | Facility: CLINIC | Age: 55
End: 2025-03-04
Payer: COMMERCIAL

## 2025-03-10 ENCOUNTER — PATIENT OUTREACH (OUTPATIENT)
Dept: GASTROENTEROLOGY | Facility: CLINIC | Age: 55
End: 2025-03-10
Payer: COMMERCIAL

## 2025-03-10 PROBLEM — D12.6 ADENOMATOUS COLON POLYP: Status: ACTIVE | Noted: 2025-03-10

## 2025-04-15 DIAGNOSIS — I65.23 BILATERAL CAROTID ARTERY STENOSIS: ICD-10-CM

## 2025-04-16 RX ORDER — ROSUVASTATIN CALCIUM 20 MG/1
20 TABLET, COATED ORAL DAILY
Qty: 90 TABLET | Refills: 0 | Status: SHIPPED | OUTPATIENT
Start: 2025-04-16

## 2025-05-08 ENCOUNTER — PATIENT OUTREACH (OUTPATIENT)
Dept: CARE COORDINATION | Facility: CLINIC | Age: 55
End: 2025-05-08
Payer: COMMERCIAL

## 2025-07-03 ENCOUNTER — OFFICE VISIT (OUTPATIENT)
Dept: INTERNAL MEDICINE | Facility: CLINIC | Age: 55
End: 2025-07-03
Payer: COMMERCIAL

## 2025-07-03 VITALS
WEIGHT: 198.4 LBS | BODY MASS INDEX: 27.77 KG/M2 | SYSTOLIC BLOOD PRESSURE: 110 MMHG | HEIGHT: 71 IN | HEART RATE: 59 BPM | OXYGEN SATURATION: 98 % | DIASTOLIC BLOOD PRESSURE: 74 MMHG | TEMPERATURE: 98 F

## 2025-07-03 DIAGNOSIS — Z00.00 ANNUAL PHYSICAL EXAM: Primary | ICD-10-CM

## 2025-07-03 DIAGNOSIS — Z12.5 SCREENING FOR PROSTATE CANCER: ICD-10-CM

## 2025-07-03 DIAGNOSIS — Z13.1 SCREENING FOR DIABETES MELLITUS: ICD-10-CM

## 2025-07-03 DIAGNOSIS — E78.5 HYPERLIPIDEMIA LDL GOAL <100: ICD-10-CM

## 2025-07-03 DIAGNOSIS — B35.1 ONYCHOMYCOSIS: ICD-10-CM

## 2025-07-03 DIAGNOSIS — K59.09 CHRONIC CONSTIPATION: ICD-10-CM

## 2025-07-03 DIAGNOSIS — Z86.0100 HISTORY OF COLONIC POLYPS: ICD-10-CM

## 2025-07-03 DIAGNOSIS — M25.512 CHRONIC LEFT SHOULDER PAIN: ICD-10-CM

## 2025-07-03 DIAGNOSIS — N52.9 ERECTILE DYSFUNCTION, UNSPECIFIED ERECTILE DYSFUNCTION TYPE: ICD-10-CM

## 2025-07-03 DIAGNOSIS — F33.0 MAJOR DEPRESSIVE DISORDER, RECURRENT EPISODE, MILD: ICD-10-CM

## 2025-07-03 DIAGNOSIS — G89.29 CHRONIC LEFT SHOULDER PAIN: ICD-10-CM

## 2025-07-03 DIAGNOSIS — F98.8 ATTENTION DEFICIT DISORDER WITHOUT HYPERACTIVITY: ICD-10-CM

## 2025-07-03 DIAGNOSIS — I65.23 BILATERAL CAROTID ARTERY STENOSIS: ICD-10-CM

## 2025-07-03 DIAGNOSIS — R68.82 DECREASED LIBIDO: ICD-10-CM

## 2025-07-03 LAB
ALBUMIN SERPL BCG-MCNC: 4.7 G/DL (ref 3.5–5.2)
ALP SERPL-CCNC: 55 U/L (ref 40–150)
ALT SERPL W P-5'-P-CCNC: 31 U/L (ref 0–70)
ANION GAP SERPL CALCULATED.3IONS-SCNC: 10 MMOL/L (ref 7–15)
AST SERPL W P-5'-P-CCNC: 27 U/L (ref 0–45)
BILIRUB SERPL-MCNC: 0.7 MG/DL
BUN SERPL-MCNC: 13 MG/DL (ref 6–20)
CALCIUM SERPL-MCNC: 9.9 MG/DL (ref 8.8–10.4)
CHLORIDE SERPL-SCNC: 101 MMOL/L (ref 98–107)
CHOLEST SERPL-MCNC: 131 MG/DL
CREAT SERPL-MCNC: 0.97 MG/DL (ref 0.67–1.17)
EGFRCR SERPLBLD CKD-EPI 2021: >90 ML/MIN/1.73M2
ERYTHROCYTE [DISTWIDTH] IN BLOOD BY AUTOMATED COUNT: 12 % (ref 10–15)
EST. AVERAGE GLUCOSE BLD GHB EST-MCNC: 114 MG/DL
FASTING STATUS PATIENT QL REPORTED: YES
FASTING STATUS PATIENT QL REPORTED: YES
GLUCOSE SERPL-MCNC: 95 MG/DL (ref 70–99)
HBA1C MFR BLD: 5.6 % (ref 0–5.6)
HCO3 SERPL-SCNC: 27 MMOL/L (ref 22–29)
HCT VFR BLD AUTO: 42.4 % (ref 40–53)
HDLC SERPL-MCNC: 44 MG/DL
HGB BLD-MCNC: 14 G/DL (ref 13.3–17.7)
LDLC SERPL CALC-MCNC: 70 MG/DL
MCH RBC QN AUTO: 29.3 PG (ref 26.5–33)
MCHC RBC AUTO-ENTMCNC: 33 G/DL (ref 31.5–36.5)
MCV RBC AUTO: 89 FL (ref 78–100)
NONHDLC SERPL-MCNC: 87 MG/DL
PLATELET # BLD AUTO: 190 10E3/UL (ref 150–450)
POTASSIUM SERPL-SCNC: 4.1 MMOL/L (ref 3.4–5.3)
PROT SERPL-MCNC: 7 G/DL (ref 6.4–8.3)
PSA SERPL DL<=0.01 NG/ML-MCNC: 1.35 NG/ML (ref 0–3.5)
RBC # BLD AUTO: 4.78 10E6/UL (ref 4.4–5.9)
SODIUM SERPL-SCNC: 138 MMOL/L (ref 135–145)
TRIGL SERPL-MCNC: 87 MG/DL
TSH SERPL DL<=0.005 MIU/L-ACNC: 2.13 UIU/ML (ref 0.3–4.2)
WBC # BLD AUTO: 5.5 10E3/UL (ref 4–11)

## 2025-07-03 RX ORDER — ASPIRIN 81 MG/1
81 TABLET, COATED ORAL DAILY
Status: SHIPPED
Start: 2025-07-03

## 2025-07-03 RX ORDER — TERBINAFINE HYDROCHLORIDE 250 MG/1
250 TABLET ORAL DAILY
Qty: 90 TABLET | Refills: 0 | Status: SHIPPED | OUTPATIENT
Start: 2025-07-03

## 2025-07-03 SDOH — HEALTH STABILITY: PHYSICAL HEALTH: ON AVERAGE, HOW MANY DAYS PER WEEK DO YOU ENGAGE IN MODERATE TO STRENUOUS EXERCISE (LIKE A BRISK WALK)?: 4 DAYS

## 2025-07-03 ASSESSMENT — SOCIAL DETERMINANTS OF HEALTH (SDOH): HOW OFTEN DO YOU GET TOGETHER WITH FRIENDS OR RELATIVES?: ONCE A WEEK

## 2025-07-03 ASSESSMENT — PAIN SCALES - GENERAL: PAINLEVEL_OUTOF10: NO PAIN (0)

## 2025-07-03 ASSESSMENT — PATIENT HEALTH QUESTIONNAIRE - PHQ9
10. IF YOU CHECKED OFF ANY PROBLEMS, HOW DIFFICULT HAVE THESE PROBLEMS MADE IT FOR YOU TO DO YOUR WORK, TAKE CARE OF THINGS AT HOME, OR GET ALONG WITH OTHER PEOPLE: SOMEWHAT DIFFICULT
SUM OF ALL RESPONSES TO PHQ QUESTIONS 1-9: 5
SUM OF ALL RESPONSES TO PHQ QUESTIONS 1-9: 5

## 2025-07-03 NOTE — PROGRESS NOTES
Office Visit - Physical   Kalyan Hubbard   55 year old  male    Date of visit: 7/3/2025  Physician: Darrin Leon MD     Assessment and Plan   1. Annual physical exam (Primary)  This is a 55-year-old man with issues as discussed below.  Ongoing healthy lifestyle discussed and recommended.  Vaccinations updated    2. Screening for prostate cancer  - Prostate Specific Antigen Screen; Future  - Prostate Specific Antigen Screen    3. Screening for diabetes mellitus  - Hemoglobin A1c; Future  - Hemoglobin A1c    4. History of colonic polyps  Colonoscopy is up-to-date    5. Bilateral carotid artery stenosis  I have recommended he continue with rosuvastatin but take a 1 month trial and see if his symptoms resolved.  Regardless he should restart rosuvastatin and look for correlation and he will let me know in a couple of months if he thinks there is a correlation  - aspirin (ASA) 81 MG EC tablet; Take 1 tablet (81 mg) by mouth daily.    6. Hyperlipidemia LDL goal <100  - CBC with platelets; Future  - Comprehensive metabolic panel; Future  - Lipid panel reflex to direct LDL Fasting; Future  - TSH with free T4 reflex; Future  - CBC with platelets  - Comprehensive metabolic panel  - Lipid panel reflex to direct LDL Fasting  - TSH with free T4 reflex    7. Chronic constipation    8. Major depressive disorder, recurrent episode, mild  9. Attention deficit disorder without hyperactivity  Stable    10. Onychomycosis  He is interested in repeating terbinafine which did seem to help.  We did discuss the risk benefit of this.  His onychomycosis is fairly mild, there is a small risk of liver failure with terbinafine.  He understands this risk and will check LFTs today and he can start terbinafine  - terbinafine (LAMISIL) 250 MG tablet; Take 1 tablet (250 mg) by mouth daily.  Dispense: 90 tablet; Refill: 0    11. Chronic left shoulder pain  - Physical Therapy  Referral; Future    12. Decreased libido  - Testosterone,  total; Future  - Testosterone, total    13. Erectile dysfunction, unspecified erectile dysfunction type  Sildenafil as needed  - Testosterone, total; Future  - Testosterone, total    Return in about 3 months (around 10/3/2025) for Office visit, if symptoms worsen/fail to improve.     Chief Complaint   Chief Complaint   Patient presents with    Physical     Pt is here for AWV. Discuss current dosage of rosuvastatin. Discuss terbinafine.        Patient Profile   Social History     Social History Narrative    Lives with his wife, Dorcas.  Works as a  for [employer].  Dorcas works as a faculty development office at [employer].  Always has been a runner.          Past Medical History   Patient Active Problem List   Diagnosis    Hyperlipidemia LDL goal <100    Erectile dysfunction, unspecified erectile dysfunction type    Rosacea    Attention deficit disorder without hyperactivity    Major depressive disorder, recurrent episode, mild    Chronic constipation    History of colonic polyps    Bilateral carotid artery stenosis       Past Surgical History  He has a past surgical history that includes appendectomy and [place] Tooth Extraction.     History of Present Illness   This 55 year old man comes in for annual visit and evaluation of a few issues.  Overall he is doing okay.  He had some aches and pains most recently his left shoulder but also some Achilles tendon issues.  Wonders if his statin might be contributing.  He stopped running but he bikes.  He likes to swim but this has been hard with his shoulder.  Has had a little bit decrease in libido and sexual function.  He took terbinafine for toenail fungus and this seemed to improve but its come back.    Review of Systems: A comprehensive review of systems was negative except as noted.     Medications and Allergies   Current Outpatient Medications   Medication Sig Dispense Refill    aspirin (ASA) 81 MG EC tablet Take 1 tablet (81 mg) by  "mouth daily.      rosuvastatin (CRESTOR) 20 MG tablet Take 1 tablet by mouth once daily 90 tablet 0    sildenafil (REVATIO) 20 MG tablet TAKE 1 TO 3 TABLETS BY MOUTH ONCE DAILY AS NEEDED FOR ERECTILE DYSFUNCTION 30 tablet 0    terbinafine (LAMISIL) 250 MG tablet Take 1 tablet (250 mg) by mouth daily. 90 tablet 0    vitamin D3 (CHOLECALCIFEROL) 50 mcg (2000 units) tablet Take 1 tablet by mouth daily       Allergies   Allergen Reactions    Paxil [Paroxetine]      suicidal ideation (age 30), hallucinations        Family and Social History   Family History   Problem Relation Age of Onset    Depression Mother     Diabetes Type 2  Mother     Diabetes Father         type II DM    C.A.D. Father         65yo ASCVD-stenting    No Known Problems Sister     No Known Problems Sister     No Known Problems Brother         Social History     Tobacco Use    Smoking status: Never    Smokeless tobacco: Never   Substance Use Topics    Alcohol use: No     Comment: very rare    Drug use: No        Physical Exam   General Appearance:   No acute distress    /74 (BP Location: Left arm, Patient Position: Sitting, Cuff Size: Adult Regular)   Pulse 59   Temp 98  F (36.7  C) (Temporal)   Ht 1.81 m (5' 11.25\")   Wt 90 kg (198 lb 6.4 oz)   SpO2 98%   BMI 27.48 kg/m      EYES: Eyelids, conjunctiva, and sclera were normal.   HEAD, EARS, NOSE, MOUTH, AND THROAT: Head and face were normal. Hearing was normal to voice and the ears were normal to external exam. Nose appearance was normal and there was no discharge.  NECK: Neck appearance was normal.   RESPIRATORY: Breathing pattern was normal and the chest moved symmetrically.    Lung sounds were normal and there were no abnormal sounds.  CARDIOVASCULAR: Heart rate and rhythm were normal.  S1 and S2 were normal and there were no extra sounds or murmurs. Peripheral pulses in arms and legs were normal.  Jugular venous pressure was normal.  There was no peripheral edema.  GASTROINTESTINAL: The " abdomen was normal in contour.    SKIN/HAIR/NAILS: He does have some mild toenail fungus  NEUROLOGIC: The patient was alert and oriented to person, place, time, and circumstance. Speech was normal. Cranial nerves were normal. Motor strength was normal for age. The patient was normally coordinated.  PSYCHIATRIC:  Mood and affect were normal and the patient had normal recent and remote memory. The patient's judgment and insight were normal.       Additional Information   The longitudinal plan of care for the diagnosis(es)/condition(s) as documented were addressed during this visit. Due to the added complexity in care, I will continue to support Kalyan in the subsequent management and with ongoing continuity of care.       Darrin Leon MD  Internal Medicine  Contact me at [phone]

## 2025-07-05 LAB — TESTOST SERPL-MCNC: 297 NG/DL (ref 240–950)

## 2025-08-02 DIAGNOSIS — I65.23 BILATERAL CAROTID ARTERY STENOSIS: ICD-10-CM

## 2025-08-04 RX ORDER — ROSUVASTATIN CALCIUM 20 MG/1
20 TABLET, COATED ORAL DAILY
Qty: 90 TABLET | Refills: 0 | Status: SHIPPED | OUTPATIENT
Start: 2025-08-04